# Patient Record
Sex: FEMALE | Race: ASIAN | NOT HISPANIC OR LATINO | URBAN - METROPOLITAN AREA
[De-identification: names, ages, dates, MRNs, and addresses within clinical notes are randomized per-mention and may not be internally consistent; named-entity substitution may affect disease eponyms.]

---

## 2020-07-21 ENCOUNTER — INPATIENT (INPATIENT)
Facility: HOSPITAL | Age: 55
LOS: 3 days | Discharge: HOME | DRG: 436 | End: 2020-07-25
Attending: FAMILY MEDICINE | Admitting: STUDENT IN AN ORGANIZED HEALTH CARE EDUCATION/TRAINING PROGRAM
Payer: COMMERCIAL

## 2020-07-21 ENCOUNTER — EMERGENCY (EMERGENCY)
Facility: HOSPITAL | Age: 55
LOS: 1 days | Discharge: ANOTHER HOSPITAL | End: 2020-07-21
Attending: EMERGENCY MEDICINE | Admitting: EMERGENCY MEDICINE
Payer: COMMERCIAL

## 2020-07-21 VITALS
SYSTOLIC BLOOD PRESSURE: 98 MMHG | TEMPERATURE: 99 F | DIASTOLIC BLOOD PRESSURE: 71 MMHG | OXYGEN SATURATION: 98 % | HEART RATE: 122 BPM | RESPIRATION RATE: 18 BRPM

## 2020-07-21 VITALS
RESPIRATION RATE: 21 BRPM | OXYGEN SATURATION: 100 % | HEIGHT: 63 IN | DIASTOLIC BLOOD PRESSURE: 76 MMHG | HEART RATE: 128 BPM | SYSTOLIC BLOOD PRESSURE: 101 MMHG | WEIGHT: 154.98 LBS | TEMPERATURE: 98 F

## 2020-07-21 VITALS
TEMPERATURE: 98 F | SYSTOLIC BLOOD PRESSURE: 105 MMHG | WEIGHT: 154.98 LBS | DIASTOLIC BLOOD PRESSURE: 74 MMHG | HEART RATE: 120 BPM | RESPIRATION RATE: 16 BRPM | OXYGEN SATURATION: 99 % | HEIGHT: 62.99 IN

## 2020-07-21 DIAGNOSIS — Z90.11 ACQUIRED ABSENCE OF RIGHT BREAST AND NIPPLE: Chronic | ICD-10-CM

## 2020-07-21 LAB
ALBUMIN SERPL ELPH-MCNC: 2 G/DL — LOW (ref 3.3–5)
ALP SERPL-CCNC: 539 U/L — HIGH (ref 30–120)
ALT FLD-CCNC: 68 U/L DA — HIGH (ref 10–60)
ANION GAP SERPL CALC-SCNC: 10 MMOL/L — SIGNIFICANT CHANGE UP (ref 5–17)
AST SERPL-CCNC: 98 U/L — HIGH (ref 10–40)
BASOPHILS # BLD AUTO: 0 K/UL — SIGNIFICANT CHANGE UP (ref 0–0.2)
BASOPHILS NFR BLD AUTO: 0 % — SIGNIFICANT CHANGE UP (ref 0–2)
BILIRUB SERPL-MCNC: 1.1 MG/DL — SIGNIFICANT CHANGE UP (ref 0.2–1.2)
BUN SERPL-MCNC: 17 MG/DL — SIGNIFICANT CHANGE UP (ref 7–23)
CALCIUM SERPL-MCNC: 11.9 MG/DL — HIGH (ref 8.4–10.5)
CHLORIDE SERPL-SCNC: 96 MMOL/L — SIGNIFICANT CHANGE UP (ref 96–108)
CK SERPL-CCNC: 238 U/L — HIGH (ref 26–192)
CO2 SERPL-SCNC: 22 MMOL/L — SIGNIFICANT CHANGE UP (ref 22–31)
CREAT SERPL-MCNC: 0.78 MG/DL — SIGNIFICANT CHANGE UP (ref 0.5–1.3)
EOSINOPHIL # BLD AUTO: 0.19 K/UL — SIGNIFICANT CHANGE UP (ref 0–0.5)
EOSINOPHIL NFR BLD AUTO: 1 % — SIGNIFICANT CHANGE UP (ref 0–6)
GLUCOSE SERPL-MCNC: 110 MG/DL — HIGH (ref 70–99)
HCT VFR BLD CALC: 28.5 % — LOW (ref 34.5–45)
HGB BLD-MCNC: 8.9 G/DL — LOW (ref 11.5–15.5)
LACTATE SERPL-SCNC: 2.5 MMOL/L — HIGH (ref 0.7–2)
LYMPHOCYTES # BLD AUTO: 1.16 K/UL — SIGNIFICANT CHANGE UP (ref 1–3.3)
LYMPHOCYTES # BLD AUTO: 6 % — LOW (ref 13–44)
MCHC RBC-ENTMCNC: 27.6 PG — SIGNIFICANT CHANGE UP (ref 27–34)
MCHC RBC-ENTMCNC: 31.2 GM/DL — LOW (ref 32–36)
MCV RBC AUTO: 88.2 FL — SIGNIFICANT CHANGE UP (ref 80–100)
MONOCYTES # BLD AUTO: 0.58 K/UL — SIGNIFICANT CHANGE UP (ref 0–0.9)
MONOCYTES NFR BLD AUTO: 3 % — SIGNIFICANT CHANGE UP (ref 2–14)
NEUTROPHILS # BLD AUTO: 17.4 K/UL — HIGH (ref 1.8–7.4)
NEUTROPHILS NFR BLD AUTO: 79 % — HIGH (ref 43–77)
NRBC # BLD: SIGNIFICANT CHANGE UP /100 WBCS (ref 0–0)
NT-PROBNP SERPL-SCNC: 355 PG/ML — HIGH (ref 0–125)
PLATELET # BLD AUTO: 315 K/UL — SIGNIFICANT CHANGE UP (ref 150–400)
POTASSIUM SERPL-MCNC: 4.8 MMOL/L — SIGNIFICANT CHANGE UP (ref 3.5–5.3)
POTASSIUM SERPL-SCNC: 4.8 MMOL/L — SIGNIFICANT CHANGE UP (ref 3.5–5.3)
PROT SERPL-MCNC: 6.5 G/DL — SIGNIFICANT CHANGE UP (ref 6–8.3)
RBC # BLD: 3.23 M/UL — LOW (ref 3.8–5.2)
RBC # FLD: 17.4 % — HIGH (ref 10.3–14.5)
SODIUM SERPL-SCNC: 128 MMOL/L — LOW (ref 135–145)
TROPONIN I SERPL-MCNC: 0.01 NG/ML — LOW (ref 0.02–0.06)
WBC # BLD: 19.33 K/UL — HIGH (ref 3.8–10.5)
WBC # FLD AUTO: 19.33 K/UL — HIGH (ref 3.8–10.5)

## 2020-07-21 PROCEDURE — 84484 ASSAY OF TROPONIN QUANT: CPT

## 2020-07-21 PROCEDURE — 87040 BLOOD CULTURE FOR BACTERIA: CPT

## 2020-07-21 PROCEDURE — 80053 COMPREHEN METABOLIC PANEL: CPT

## 2020-07-21 PROCEDURE — 82550 ASSAY OF CK (CPK): CPT

## 2020-07-21 PROCEDURE — 36415 COLL VENOUS BLD VENIPUNCTURE: CPT

## 2020-07-21 PROCEDURE — 83605 ASSAY OF LACTIC ACID: CPT

## 2020-07-21 PROCEDURE — 71045 X-RAY EXAM CHEST 1 VIEW: CPT

## 2020-07-21 PROCEDURE — 71045 X-RAY EXAM CHEST 1 VIEW: CPT | Mod: 26

## 2020-07-21 PROCEDURE — 93010 ELECTROCARDIOGRAM REPORT: CPT

## 2020-07-21 PROCEDURE — 96374 THER/PROPH/DIAG INJ IV PUSH: CPT

## 2020-07-21 PROCEDURE — 71275 CT ANGIOGRAPHY CHEST: CPT | Mod: 26

## 2020-07-21 PROCEDURE — 83880 ASSAY OF NATRIURETIC PEPTIDE: CPT

## 2020-07-21 PROCEDURE — 99285 EMERGENCY DEPT VISIT HI MDM: CPT

## 2020-07-21 PROCEDURE — 99285 EMERGENCY DEPT VISIT HI MDM: CPT | Mod: 25

## 2020-07-21 PROCEDURE — 82272 OCCULT BLD FECES 1-3 TESTS: CPT

## 2020-07-21 PROCEDURE — 99283 EMERGENCY DEPT VISIT LOW MDM: CPT

## 2020-07-21 PROCEDURE — 85027 COMPLETE CBC AUTOMATED: CPT

## 2020-07-21 PROCEDURE — 71275 CT ANGIOGRAPHY CHEST: CPT

## 2020-07-21 PROCEDURE — 93005 ELECTROCARDIOGRAM TRACING: CPT

## 2020-07-21 RX ORDER — SODIUM CHLORIDE 9 MG/ML
1000 INJECTION INTRAMUSCULAR; INTRAVENOUS; SUBCUTANEOUS ONCE
Refills: 0 | Status: COMPLETED | OUTPATIENT
Start: 2020-07-21 | End: 2020-07-21

## 2020-07-21 RX ORDER — ASPIRIN/CALCIUM CARB/MAGNESIUM 324 MG
325 TABLET ORAL ONCE
Refills: 0 | Status: COMPLETED | OUTPATIENT
Start: 2020-07-21 | End: 2020-07-21

## 2020-07-21 RX ORDER — FUROSEMIDE 40 MG
40 TABLET ORAL ONCE
Refills: 0 | Status: COMPLETED | OUTPATIENT
Start: 2020-07-21 | End: 2020-07-21

## 2020-07-21 RX ADMIN — Medication 325 MILLIGRAM(S): at 20:34

## 2020-07-21 RX ADMIN — Medication 40 MILLIGRAM(S): at 20:34

## 2020-07-21 RX ADMIN — SODIUM CHLORIDE 1000 MILLILITER(S): 9 INJECTION INTRAMUSCULAR; INTRAVENOUS; SUBCUTANEOUS at 23:23

## 2020-07-21 NOTE — ED ADULT NURSE NOTE - CHPI ED NUR SYMPTOMS NEG
no fever/no back pain/no nausea/no chest pain/no diaphoresis/no vomiting/no chills/no syncope/no dizziness

## 2020-07-21 NOTE — ED PROVIDER NOTE - OBJECTIVE STATEMENT
53yo female who presents with sob and leg swelling for a week. pt c/o increased leg swelling with worsening sob, no chest pain, no cough, fever, chills, pt does not have hx of chf

## 2020-07-21 NOTE — ED ADULT TRIAGE NOTE - CHIEF COMPLAINT QUOTE
I was having swelling to my legs for 1 week and feeling SOB today so I was my PMD today and he sent me here

## 2020-07-21 NOTE — ED ADULT NURSE NOTE - OBJECTIVE STATEMENT
pt c/o BL LE swelling and SOB on and off over the past few days worse today. + orthopnea, no hx of CHF in the past. pt denies vomiting, PO intolerance, neck pain, recent travel, fevers, sick contacts, SHx, headache, blurred vision, sinus congestion, hematuria, chest pain, blurred vision or any other complaints.

## 2020-07-21 NOTE — ED CLERICAL - CLERICAL COMMENTS
called [facility] ems for transport to [facility]. ems did not give me a time.

## 2020-07-21 NOTE — ED ADULT NURSE NOTE - NSSEPSISCRITERIAMET_ED_A_ED
Abnormal VS & WBC Abnormal VS & WBC/Abnormal Bands Abnormal Lactate/Abnormal Bands/Abnormal VS & WBC

## 2020-07-21 NOTE — ED PROVIDER NOTE - EKG #1 DATE/TIME
Plan for IVC filter in OR today  NPO, heparin gtt on hold for procedure  will need full dose Anticoagulation x 6months 21-Jul-2020 23:25

## 2020-07-21 NOTE — ED ADULT NURSE NOTE - OBJECTIVE STATEMENT
Received patient in bed 1. AOX4. 55yo female who presents with sob and leg swelling for a week. pt c/o increased leg swelling with worsening sob, no chest pain, no cough, fever, chills, pt does not have hx of chf. Transfer from [place] via [facility]. Received with left wrist @22g. Witnessed ambulating with steady gait. No BP right, bracelet intact. Dr. Martinez at bedside. EKG/monitor completed. Awaiting admission orders

## 2020-07-21 NOTE — ED PROVIDER NOTE - NSRISKOFTRANSFER_ED_A_ED
Transportation Risk (There is always a risk of traffic delays resulting in deterioration of condition.)/Increased Pain/Death or Disability/Deterioration of Condition En Route

## 2020-07-21 NOTE — ED PROVIDER NOTE - CLINICAL SUMMARY MEDICAL DECISION MAKING FREE TEXT BOX
53yo female with sob and leg swelling, ekg, labs, xr, r/o chf, transfer to [place] for admission

## 2020-07-22 DIAGNOSIS — Z29.9 ENCOUNTER FOR PROPHYLACTIC MEASURES, UNSPECIFIED: ICD-10-CM

## 2020-07-22 DIAGNOSIS — C50.919 MALIGNANT NEOPLASM OF UNSPECIFIED SITE OF UNSPECIFIED FEMALE BREAST: ICD-10-CM

## 2020-07-22 DIAGNOSIS — R06.00 DYSPNEA, UNSPECIFIED: ICD-10-CM

## 2020-07-22 DIAGNOSIS — J90 PLEURAL EFFUSION, NOT ELSEWHERE CLASSIFIED: ICD-10-CM

## 2020-07-22 DIAGNOSIS — I10 ESSENTIAL (PRIMARY) HYPERTENSION: ICD-10-CM

## 2020-07-22 DIAGNOSIS — R00.0 TACHYCARDIA, UNSPECIFIED: ICD-10-CM

## 2020-07-22 DIAGNOSIS — Z90.11 ACQUIRED ABSENCE OF RIGHT BREAST AND NIPPLE: Chronic | ICD-10-CM

## 2020-07-22 DIAGNOSIS — R60.0 LOCALIZED EDEMA: ICD-10-CM

## 2020-07-22 LAB
AFP-TM SERPL-MCNC: 3 NG/ML — SIGNIFICANT CHANGE UP
ALBUMIN SERPL ELPH-MCNC: 1.6 G/DL — LOW (ref 3.3–5)
ALBUMIN SERPL ELPH-MCNC: 1.7 G/DL — LOW (ref 3.3–5)
ALP SERPL-CCNC: 441 U/L — HIGH (ref 40–120)
ALP SERPL-CCNC: 535 U/L — HIGH (ref 40–120)
ALT FLD-CCNC: 55 U/L — SIGNIFICANT CHANGE UP (ref 12–78)
ALT FLD-CCNC: 60 U/L — SIGNIFICANT CHANGE UP (ref 12–78)
ANION GAP SERPL CALC-SCNC: 8 MMOL/L — SIGNIFICANT CHANGE UP (ref 5–17)
ANION GAP SERPL CALC-SCNC: 8 MMOL/L — SIGNIFICANT CHANGE UP (ref 5–17)
ANISOCYTOSIS BLD QL: SLIGHT — SIGNIFICANT CHANGE UP
AST SERPL-CCNC: 73 U/L — HIGH (ref 15–37)
AST SERPL-CCNC: 85 U/L — HIGH (ref 15–37)
BASOPHILS # BLD AUTO: 0 K/UL — SIGNIFICANT CHANGE UP (ref 0–0.2)
BASOPHILS # BLD AUTO: 0.03 K/UL — SIGNIFICANT CHANGE UP (ref 0–0.2)
BASOPHILS NFR BLD AUTO: 0 % — SIGNIFICANT CHANGE UP (ref 0–2)
BASOPHILS NFR BLD AUTO: 0.2 % — SIGNIFICANT CHANGE UP (ref 0–2)
BCA 255 TISS QL IMSTN: 55.6 U/ML — HIGH
BILIRUB SERPL-MCNC: 1 MG/DL — SIGNIFICANT CHANGE UP (ref 0.2–1.2)
BILIRUB SERPL-MCNC: 1.1 MG/DL — SIGNIFICANT CHANGE UP (ref 0.2–1.2)
BUN SERPL-MCNC: 15 MG/DL — SIGNIFICANT CHANGE UP (ref 7–23)
BUN SERPL-MCNC: 15 MG/DL — SIGNIFICANT CHANGE UP (ref 7–23)
CALCIUM SERPL-MCNC: 10 MG/DL — SIGNIFICANT CHANGE UP (ref 8.5–10.1)
CALCIUM SERPL-MCNC: 10.5 MG/DL — HIGH (ref 8.5–10.1)
CEA SERPL-MCNC: 6607 NG/ML — HIGH (ref 0–3.8)
CHLORIDE SERPL-SCNC: 103 MMOL/L — SIGNIFICANT CHANGE UP (ref 96–108)
CHLORIDE SERPL-SCNC: 99 MMOL/L — SIGNIFICANT CHANGE UP (ref 96–108)
CO2 SERPL-SCNC: 23 MMOL/L — SIGNIFICANT CHANGE UP (ref 22–31)
CO2 SERPL-SCNC: 26 MMOL/L — SIGNIFICANT CHANGE UP (ref 22–31)
CREAT SERPL-MCNC: 0.54 MG/DL — SIGNIFICANT CHANGE UP (ref 0.5–1.3)
CREAT SERPL-MCNC: 0.62 MG/DL — SIGNIFICANT CHANGE UP (ref 0.5–1.3)
CRP SERPL-MCNC: 17.43 MG/DL — HIGH (ref 0–0.4)
EOSINOPHIL # BLD AUTO: 0 K/UL — SIGNIFICANT CHANGE UP (ref 0–0.5)
EOSINOPHIL # BLD AUTO: 0.01 K/UL — SIGNIFICANT CHANGE UP (ref 0–0.5)
EOSINOPHIL NFR BLD AUTO: 0 % — SIGNIFICANT CHANGE UP (ref 0–6)
EOSINOPHIL NFR BLD AUTO: 0.1 % — SIGNIFICANT CHANGE UP (ref 0–6)
ERYTHROCYTE [SEDIMENTATION RATE] IN BLOOD: 105 MM/HR — HIGH (ref 0–20)
FERRITIN SERPL-MCNC: 7272 NG/ML — HIGH (ref 15–150)
FOLATE SERPL-MCNC: 3.3 NG/ML — LOW
GLUCOSE SERPL-MCNC: 131 MG/DL — HIGH (ref 70–99)
GLUCOSE SERPL-MCNC: 99 MG/DL — SIGNIFICANT CHANGE UP (ref 70–99)
HCT VFR BLD CALC: 23.3 % — LOW (ref 34.5–45)
HCT VFR BLD CALC: 23.5 % — LOW (ref 34.5–45)
HGB BLD-MCNC: 7.5 G/DL — LOW (ref 11.5–15.5)
HGB BLD-MCNC: 7.5 G/DL — LOW (ref 11.5–15.5)
IMM GRANULOCYTES NFR BLD AUTO: 3 % — HIGH (ref 0–1.5)
IRON SATN MFR SERPL: 24 % — SIGNIFICANT CHANGE UP (ref 14–50)
IRON SATN MFR SERPL: 38 UG/DL — SIGNIFICANT CHANGE UP (ref 30–160)
LACTATE SERPL-SCNC: 2.1 MMOL/L — HIGH (ref 0.7–2)
LYMPHOCYTES # BLD AUTO: 0.87 K/UL — LOW (ref 1–3.3)
LYMPHOCYTES # BLD AUTO: 1.05 K/UL — SIGNIFICANT CHANGE UP (ref 1–3.3)
LYMPHOCYTES # BLD AUTO: 6 % — LOW (ref 13–44)
LYMPHOCYTES # BLD AUTO: 7.8 % — LOW (ref 13–44)
MACROCYTES BLD QL: SLIGHT — SIGNIFICANT CHANGE UP
MAGNESIUM SERPL-MCNC: 1.9 MG/DL — SIGNIFICANT CHANGE UP (ref 1.6–2.6)
MANUAL SMEAR VERIFICATION: YES — SIGNIFICANT CHANGE UP
MCHC RBC-ENTMCNC: 27.5 PG — SIGNIFICANT CHANGE UP (ref 27–34)
MCHC RBC-ENTMCNC: 27.7 PG — SIGNIFICANT CHANGE UP (ref 27–34)
MCHC RBC-ENTMCNC: 31.9 GM/DL — LOW (ref 32–36)
MCHC RBC-ENTMCNC: 32.2 GM/DL — SIGNIFICANT CHANGE UP (ref 32–36)
MCV RBC AUTO: 86 FL — SIGNIFICANT CHANGE UP (ref 80–100)
MCV RBC AUTO: 86.1 FL — SIGNIFICANT CHANGE UP (ref 80–100)
MICROCYTES BLD QL: SLIGHT — SIGNIFICANT CHANGE UP
MONOCYTES # BLD AUTO: 1.2 K/UL — HIGH (ref 0–0.9)
MONOCYTES # BLD AUTO: 1.6 K/UL — HIGH (ref 0–0.9)
MONOCYTES NFR BLD AUTO: 11 % — SIGNIFICANT CHANGE UP (ref 2–14)
MONOCYTES NFR BLD AUTO: 8.9 % — SIGNIFICANT CHANGE UP (ref 2–14)
NEUTROPHILS # BLD AUTO: 10.83 K/UL — HIGH (ref 1.8–7.4)
NEUTROPHILS # BLD AUTO: 12.04 K/UL — HIGH (ref 1.8–7.4)
NEUTROPHILS NFR BLD AUTO: 80 % — HIGH (ref 43–77)
NEUTROPHILS NFR BLD AUTO: 83 % — HIGH (ref 43–77)
NRBC # BLD: 0 /100 WBCS — SIGNIFICANT CHANGE UP (ref 0–0)
NRBC # BLD: 0 — SIGNIFICANT CHANGE UP
NRBC # BLD: SIGNIFICANT CHANGE UP /100 WBCS (ref 0–0)
PHOSPHATE SERPL-MCNC: 2.4 MG/DL — LOW (ref 2.5–4.5)
PLAT MORPH BLD: NORMAL — SIGNIFICANT CHANGE UP
PLATELET # BLD AUTO: 258 K/UL — SIGNIFICANT CHANGE UP (ref 150–400)
PLATELET # BLD AUTO: 265 K/UL — SIGNIFICANT CHANGE UP (ref 150–400)
POTASSIUM SERPL-MCNC: 3.6 MMOL/L — SIGNIFICANT CHANGE UP (ref 3.5–5.3)
POTASSIUM SERPL-MCNC: 3.8 MMOL/L — SIGNIFICANT CHANGE UP (ref 3.5–5.3)
POTASSIUM SERPL-SCNC: 3.6 MMOL/L — SIGNIFICANT CHANGE UP (ref 3.5–5.3)
POTASSIUM SERPL-SCNC: 3.8 MMOL/L — SIGNIFICANT CHANGE UP (ref 3.5–5.3)
PROT SERPL-MCNC: 5.3 G/DL — LOW (ref 6–8.3)
PROT SERPL-MCNC: 5.5 G/DL — LOW (ref 6–8.3)
RBC # BLD: 2.7 M/UL — LOW (ref 3.8–5.2)
RBC # BLD: 2.71 M/UL — LOW (ref 3.8–5.2)
RBC # BLD: 2.73 M/UL — LOW (ref 3.8–5.2)
RBC # FLD: 17.3 % — HIGH (ref 10.3–14.5)
RBC # FLD: 17.7 % — HIGH (ref 10.3–14.5)
RBC BLD AUTO: SIGNIFICANT CHANGE UP
RETICS #: 119.9 K/UL — SIGNIFICANT CHANGE UP (ref 25–125)
RETICS/RBC NFR: 4.4 % — HIGH (ref 0.5–2.5)
SARS-COV-2 IGG SERPL QL IA: NEGATIVE — SIGNIFICANT CHANGE UP
SARS-COV-2 IGM SERPL IA-ACNC: <0.1 INDEX — SIGNIFICANT CHANGE UP
SARS-COV-2 RNA SPEC QL NAA+PROBE: SIGNIFICANT CHANGE UP
SODIUM SERPL-SCNC: 133 MMOL/L — LOW (ref 135–145)
SODIUM SERPL-SCNC: 134 MMOL/L — LOW (ref 135–145)
TIBC SERPL-MCNC: 159 UG/DL — LOW (ref 220–430)
UIBC SERPL-MCNC: 121 UG/DL — SIGNIFICANT CHANGE UP (ref 110–370)
WBC # BLD: 13.52 K/UL — HIGH (ref 3.8–10.5)
WBC # BLD: 14.51 K/UL — HIGH (ref 3.8–10.5)
WBC # FLD AUTO: 13.52 K/UL — HIGH (ref 3.8–10.5)
WBC # FLD AUTO: 14.51 K/UL — HIGH (ref 3.8–10.5)

## 2020-07-22 PROCEDURE — 74183 MRI ABD W/O CNTR FLWD CNTR: CPT | Mod: 26

## 2020-07-22 PROCEDURE — 93010 ELECTROCARDIOGRAM REPORT: CPT

## 2020-07-22 PROCEDURE — 93970 EXTREMITY STUDY: CPT | Mod: 26

## 2020-07-22 PROCEDURE — 99223 1ST HOSP IP/OBS HIGH 75: CPT | Mod: GC,AI

## 2020-07-22 RX ORDER — ENOXAPARIN SODIUM 100 MG/ML
40 INJECTION SUBCUTANEOUS AT BEDTIME
Refills: 0 | Status: DISCONTINUED | OUTPATIENT
Start: 2020-07-22 | End: 2020-07-23

## 2020-07-22 RX ORDER — IBUPROFEN 200 MG
200 TABLET ORAL ONCE
Refills: 0 | Status: COMPLETED | OUTPATIENT
Start: 2020-07-22 | End: 2020-07-22

## 2020-07-22 RX ORDER — METOPROLOL TARTRATE 50 MG
5 TABLET ORAL ONCE
Refills: 0 | Status: COMPLETED | OUTPATIENT
Start: 2020-07-22 | End: 2020-07-22

## 2020-07-22 RX ADMIN — Medication 200 MILLIGRAM(S): at 09:07

## 2020-07-22 RX ADMIN — ENOXAPARIN SODIUM 40 MILLIGRAM(S): 100 INJECTION SUBCUTANEOUS at 21:34

## 2020-07-22 RX ADMIN — Medication 20 MILLIGRAM(S): at 05:46

## 2020-07-22 RX ADMIN — Medication 200 MILLIGRAM(S): at 10:22

## 2020-07-22 RX ADMIN — Medication 5 MILLIGRAM(S): at 09:29

## 2020-07-22 NOTE — ED ADULT NURSE REASSESSMENT NOTE - NS ED NURSE REASSESS COMMENT FT1
Patient provided food at this time and abner. Speaking to daughter on the phone at this time. No s/s of distress

## 2020-07-22 NOTE — PROGRESS NOTE ADULT - PROBLEM SELECTOR PLAN 4
Patient w/ hx of tachycardia after R mastectomy  -continue to monitor on remote telemetry  -Lopressor ordered.  -monitor vitals.

## 2020-07-22 NOTE — H&P ADULT - NSHPREVIEWOFSYSTEMS_GEN_ALL_CORE
REVIEW OF SYSTEMS:  CONSTITUTIONAL: No weakness, fevers or chills  EYES/ENT: No visual changes;  No vertigo or throat pain   RESPIRATORY: No cough, wheezing, hemoptysis; Positive for shortness of breath, orthopnea, BALLARD  CARDIOVASCULAR: No chest pain or palpitations. Positive for tachycardia  GASTROINTESTINAL: No abdominal or epigastric pain. No nausea, vomiting, or hematemesis; No diarrhea or constipation. No melena or hematochezia.  GENITOURINARY: No dysuria, frequency or hematuria  NEUROLOGICAL: No numbness or weakness CONSTITUTIONAL: denies fever, chills, fatigue, weakness  HEENT: denies blurred vision, sore throat  SKIN: denies new lesions, rash  CARDIOVASCULAR: denies chest pain, chest pressure, palpitations  RESPIRATORY: No cough, wheezing, hemoptysis; Positive for shortness of breath, orthopnea, BALLARD  GASTROINTESTINAL: denies nausea, vomiting, diarrhea, abdominal pain  GENITOURINARY: denies dysuria, discharge  NEUROLOGICAL: denies numbness, headache, focal weakness  MUSCULOSKELETAL: denies new joint pain, muscle aches  HEMATOLOGIC: denies gross bleeding, bruising  LYMPHATICS: denies enlarged lymph nodes, extremity swelling  PSYCHIATRIC: denies recent changes in anxiety, depression  ENDOCRINOLOGIC: denies sweating, cold or heat intolerance

## 2020-07-22 NOTE — PROGRESS NOTE ADULT - PROBLEM SELECTOR PLAN 1
Hx of breast CA s/p mastectomy, radiation, and chemo   -CTA showed possible liver and rib mets.   -Pt reports biopsy in March 2020 at [place] confirmed scar tissue in the ribs.  -evaluate for CHF. TTE ordered   -Jamir w/ Lasix

## 2020-07-22 NOTE — H&P ADULT - ASSESSMENT
53yo F with PMH of breast CA s/p R mastectomy, radiation, chemo (final treatment May 2019), HTN, and tachycardia presents to the ED for worsening SOB, BALLARD, and BLE edema x1 week. Patient is being admitted for dyspnea 55yo F with PMH of breast CA s/p R mastectomy, radiation, chemo (final treatment May 2019), HTN, and tachycardia presents to the ED for worsening SOB, BALLARD, and BLE edema x1 week. Patient is being admitted for dyspnea - evaluate for CHF. Concern for metastatic disease.

## 2020-07-22 NOTE — H&P ADULT - NSHPSOCIALHISTORY_GEN_ALL_CORE
Denies tobacco, alcohol, and recreational drug use Lives at home  Denies tobacco, alcohol, and recreational drug use  Colonoscopy >10 years ago - negative  PAP smear 1 year ago - negative

## 2020-07-22 NOTE — H&P ADULT - HISTORY OF PRESENT ILLNESS
53yo F with PMH of breast CA s/p R mastectomy, radiation, chemo (final treatment May 2019), HTN, and tachycardia presents to the ED for worsening SOB, BALLARD, and BLE edema x1 week. Patient states that for the past 2mo, she has been experiencing mild dyspnea on exertion. Over the past week, she notes significant worsening of her sx. Patient has no prior hx of CHF. Patient was transferred from [place] ED. RR was 21 and HR was 128. Patient states that she has been tachycardic at baseline since her mastectomy last year. She was found to have a WBC count of 19.3, proBNP of 355, alk phos 539, and AST/ALT of 93 and 68. CTA was performed and showed R pleural effusion, 5mm ground glass nodules in RUL, and suspicious liver and rib mets. Patient was given Lasix 40mg and 1L NS in the ED.     In the ED:  Vitals:  and RR 20. All other vitals stable  CBC: WBC count 14.5, H&H 7.5/23.3  Chem: Albumin 1.6, Alk phos 441, AST 73, ALT 55

## 2020-07-22 NOTE — CONSULT NOTE ADULT - SUBJECTIVE AND OBJECTIVE BOX
Patient is a 54y old  Female who presents with a chief complaint of SOB and BLE edema (2020 03:01)      HPI:  55yo F with PMH of breast CA s/p R mastectomy, radiation, chemo (final treatment May 2019), HTN, and tachycardia presents to the ED for worsening SOB, BALLARD, and BLE edema x1 week. Patient states that for the past 2mo, she has been experiencing mild dyspnea on exertion. Over the past week, she notes significant worsening of her sx. Patient has no prior hx of CHF. Patient was transferred from [place] ED. RR was 21 and HR was 128. Patient states that she has been tachycardic at baseline since her mastectomy last year. She was found to have a WBC count of 19.3, proBNP of 355, alk phos 539, and AST/ALT of 93 and 68. CTA was performed and showed R pleural effusion, 5mm ground glass nodules in RUL, and suspicious liver and rib mets. Patient was given Lasix 40mg and 1L NS in the ED.     In the ED:  Vitals:  and RR 20. All other vitals stable  CBC: WBC count 14.5, H&H 7.5/23.3  Chem: Albumin 1.6, Alk phos 441, AST 73, ALT 55 (2020 03:01)        PAST MEDICAL & SURGICAL HISTORY:  Breast cancer: s/p R mastectomy, radiation, and chemotherapy  Hypertension  HTN (hypertension)  H/O mastectomy, right: 2019  H/O mastectomy, right       HEALTH ISSUES - PROBLEM Dx:  Hypertension: Hypertension  Need for prophylactic measure: Need for prophylactic measure  Tachycardia: Tachycardia  Lower extremity edema: Lower extremity edema  Breast cancer: Breast cancer  Dyspnea, unspecified type: Dyspnea, unspecified type          Pleural effusion, not elsewhere classified (J90)  Breast cancer (C50.919)  Pleural effusion (J90)  Hypertension (I10)  HTN (hypertension) (I10)  H/O mastectomy, right (Z90.11)  H/O mastectomy, right (Z90.11)  Tachycardia (R00.0)      FAMILY HISTORY:  FH: diabetes mellitus: Mother  father   mother       [SOCIAL HISTORY: ]     smoking:  denies     EtOH:  denies     illicit drugs:  denies     occupation:       marital status:  , 1 dtr     Other:       [ALLERGIES/INTOLERANCES:]  Allergies     amoxicillin (Hives)  Intolerances      [MEDICATIONS]  MEDICATIONS  (STANDING):  enalapril 20 milliGRAM(s) Oral daily  enoxaparin Injectable 40 milliGRAM(s) SubCutaneous at bedtime    MEDICATIONS  (PRN):        [REVIEW OF SYSTEMS: ]  CONSTITUTIONAL: normal, no fever, no shakes, no chills   EYES: No eye pain, no visual disturbances, no discharge  ENMT:  no discharge  NECK: No pain, no stiffness  BREASTS: No pain, no masses, no nipple discharge  RESPIRATORY: No cough, no wheezing, no chills, no hemoptysis; +shortness of breath  CARDIOVASCULAR: No chest pain, no palpitations, no dizziness, no leg swelling, +tachycardia  GASTROINTESTINAL: No abdominal, no epigastric pain. No nausea, no vomiting, no hematemesis; No diarrhea , no constipation. No melena, no hematochezia.  GENITOURINARY: No dysuria, no frequency, no hematuria, no incontinence  NEUROLOGICAL: No headaches, no memory loss, no loss of strength, no numbness, no tremors  SKIN: No itching, no burning, no rashes, no lesions   LYMPH NODES: No enlarged glands  ENDOCRINE: No heat or cold intolerance; No hair loss  MUSCULOSKELETAL: No joint pain or swelling; No muscle, no back, no extremity pain  PSYCHIATRIC: No depression, no anxiety, no mood swings, no difficulty sleeping  HEME/LYMPH: No easy bruising, no bleeding gums      [VITALS SIGNS 24hrs]  Vital Signs Last 24 Hrs  T(C): 36.9 (2020 08:49), Max: 37.5 (2020 05:40)  T(F): 98.4 (2020 08:49), Max: 99.5 (2020 05:40)  HR: 116 (2020 10:17) (108 - 137)  BP: 107/72 (2020 10:17) (92/63 - 132/85)  BP(mean): --  RR: 17 (2020 08:49) (16 - 22)  SpO2: 99% (2020 08:49) (95% - 100%)    [PHYSICAL EXAM]  General: adult in NAD,  WN,  WD.  HEENT: clear oropharynx, anicteric sclera, pink conjunctivae.  Neck: supple, no masses.  CV: normal S1S2, no murmur, no rubs, no gallops.  Lungs: clear to auscultation, no wheezes, no rales, no rhonchi.  Abdomen: soft, non-tender, non-distended, no hepatosplenomegaly, normal BS, no guarding.  Ext: no clubbing, no cyanosis, no edema.   Skin: no rashes,  no petechiae, no venous stasis changes.  Neuro: alert and oriented X3, no focal motor deficits.  LN: no SC ARNOL.  R mastectomy      [LABS: ]                        7.5    13.52 )-----------( 258      ( 2020 07:12 )             23.5     CBC Full  -  ( 2020 07:12 )  WBC Count : 13.52 K/uL  RBC Count : 2.73 M/uL  Hemoglobin : 7.5 g/dL  Hematocrit : 23.5 %  Platelet Count - Automated : 258 K/uL  Mean Cell Volume : 86.1 fl  Mean Cell Hemoglobin : 27.5 pg  Mean Cell Hemoglobin Concentration : 31.9 gm/dL  Auto Neutrophil # : 10.83 K/uL  Auto Lymphocyte # : 1.05 K/uL  Auto Monocyte # : 1.20 K/uL  Auto Eosinophil # : 0.01 K/uL  Auto Basophil # : 0.03 K/uL  Auto Neutrophil % : 80.0 %  Auto Lymphocyte % : 7.8 %  Auto Monocyte % : 8.9 %  Auto Eosinophil % : 0.1 %  Auto Basophil % : 0.2 %        133<L>  |  99  |  15  ----------------------------<  131<H>  3.6   |  26  |  0.62    Ca    10.5<H>      2020 07:12  Phos  2.4       Mg     1.9         TPro  5.5<L>  /  Alb  1.7<L>  /  TBili  1.1  /  DBili  x   /  AST  85<H>  /  ALT  60  /  AlkPhos  535<H>        LIVER FUNCTIONS - ( 2020 07:12 )  Alb: 1.7 g/dL / Pro: 5.5 g/dL / ALK PHOS: 535 U/L / ALT: 60 U/L / AST: 85 U/L / GGT: x           CARDIAC MARKERS ( 2020 20:34 )  .011 ng/mL / x     / 238 U/L / x     / x              WBC  TREND (5 Days)  WBC Count: 13.52 K/uL ( @ 07:12)  WBC Count: 14.51 K/uL ( @ 00:16)  WBC Count: 19.33 K/uL ( @ 20:34)    HGB  TREND (5 Days)  Hemoglobin: 7.5 g/dL ( @ 07:12)  Hemoglobin: 7.5 g/dL ( 00:16)  Hemoglobin: 8.9 g/dL ( 20:34)    HCT  TREND (5 Days)  Hematocrit: 23.5 % ( @ 07:12)  Hematocrit: 23.3 % (:16)  Hematocrit: 28.5 % (:34)    PLT  TREND (5 Days)  Platelet Count - Automated: 258 K/uL ( @ 07:12)  Platelet Count - Automated: 265 K/uL ( @ 00:16)  Platelet Count - Automated: 315 K/uL ( 20:34)    COVID-19 PCR: NotDetec (2020 00:22)              [RADIOLOGY & ADDITIONAL STUDIES:]     < from: CT Angio Chest w/ IV Cont (20 21:22) >  EXAM:  CT ANGIO CHEST (W)AW IC                        PROCEDURE DATE:  2020    INTERPRETATION:  EXAMINATION:  CTA CHEST WITH IV CONTRAST, CT PULMONARY ANGIOGRAM  DATE OF EXAM: 2020 9:22 PM  HISTORY: Shortness of breath, chest pain  TECHNIQUE: CTA examination of the chest was performed following the intravenous administration of 71 mL Omnipaque 350.  29 mL Omnipaque 350 discarded. Sagittal, coronal and 3-D reformatted images were provided. CT dose lowering techniques were used, to include: automated exposure control, adjustment for patient size, and or use of iterative reconstruction.  COMPARISON: None.  FINDINGS:  ·	Lungs: 5 mm groundglass nodule in the right upper lobe on axial series 5, image 54. Central airways are clear.  ·	Pleura: Small right pleural effusion. No pneumothorax.  ·	Mediastinum and Karen: No adenopathy or hemorrhage.  ·	Pulmonary Arteries: Well opacified to the distal segmental level. No acute thromboembolus. Main pulmonary artery is normal in caliber.  ·	Cardiovascular: Heart size is normal. Thoracic aorta normal in caliber without dissection.   ·	Upper Abdomen: A numerable hypodense nodules in the severely enlarged liver..  ·	Chest Wall: Right-sided mastectomy.  ·	Musculoskeletal: Lytic lesion in the posterior lateral left 10th rib. This is best seen on coronal series 7, image 52 and sagittal series 8, image 24. This lesion extends to both the anterior and posterior cortex..  IMPRESSION:  1.  No acute pulmonary embolus.  2.  Appearance of the liver is highly suspicious for metastatic disease.  3.  Small right pleural effusion.  4.  Lytic lesion in the left 10th rib, also suspicious for metastasis.  5.  5 mm groundglass micronodules in the right upper lobe, indeterminate.  < end of copied text >      < from: US Duplex Venous Lower Ext Complete, Bilateral (20 @ 12:05) >  EXAM:  US DPLX LWR EXT VEINS COMPL BI                        PROCEDURE DATE:  2020    INTERPRETATION:  CLINICAL INFORMATION: Bilateral leg swelling  COMPARISON: None available.  TECHNIQUE: Duplex sonography of the BILATERAL LOWER extremity veins with color and spectral Doppler, with and without compression.    FINDINGS:  ·	There is normal compressibility of the bilateral common femoral, femoral and popliteal veins.   ·	Doppler examination shows normal spontaneous and phasic flow.  ·	No calf vein thrombosis is detected.  ·	Chronic appearing organized hematoma in the distal right calf measuring approximately 3.9 x 1.9 cm  IMPRESSION:   ·	No evidence of deep venous thrombosis in either lower extremity.  ·	Chronic appearing organized hematoma in the distal right calf. Follow-up as clinically indicated  < end of copied text >

## 2020-07-22 NOTE — PROGRESS NOTE ADULT - SUBJECTIVE AND OBJECTIVE BOX
Patient is a 54y old  Female who presents with a chief complaint of SOB and BLE edema (22 Jul 2020 12:17)      INTERVAL HPI/OVERNIGHT EVENTS: Pt was seen and examined at the bedside this morning. She reports that she is uncomfortable because she still has shortness of breath.    MEDICATIONS  (STANDING):  enalapril 20 milliGRAM(s) Oral daily  enoxaparin Injectable 40 milliGRAM(s) SubCutaneous at bedtime    MEDICATIONS  (PRN):      Allergies    amoxicillin (Hives)    Intolerances        REVIEW OF SYSTEMS:  CONSTITUTIONAL: No fever or chills  HEENT: No headache, no sore throat  RESPIRATORY: No cough, wheezing, or shortness of breath  CARDIOVASCULAR: No chest pain, palpitations  GASTROINTESTINAL: No abd pain, nausea, vomiting, or diarrhea  GENITOURINARY: No dysuria, frequency, or hematuria  NEUROLOGICAL: No focal weakness or dizziness  MUSCULOSKELETAL: No myalgias     Vital Signs Last 24 Hrs  T(C): 36.9 (22 Jul 2020 08:49), Max: 37.5 (22 Jul 2020 05:40)  T(F): 98.4 (22 Jul 2020 08:49), Max: 99.5 (22 Jul 2020 05:40)  HR: 116 (22 Jul 2020 10:17) (108 - 137)  BP: 107/72 (22 Jul 2020 10:17) (92/63 - 132/85)  BP(mean): --  RR: 17 (22 Jul 2020 08:49) (16 - 22)  SpO2: 99% (22 Jul 2020 08:49) (95% - 100%)    PHYSICAL EXAM:  GENERAL: NAD  HEENT:  anicteric, moist mucous membranes  CHEST/LUNG:  CTA b/l, no rales, wheezes, or rhonchi  HEART:  RRR, S1, S2  ABDOMEN:  BS+, soft, nontender, nondistended  EXTREMITIES: no edema, cyanosis, or calf tenderness  NERVOUS SYSTEM: answers questions and follows commands appropriately    LABS:                        7.5    13.52 )-----------( 258      ( 22 Jul 2020 07:12 )             23.5     CBC Full  -  ( 22 Jul 2020 07:12 )  WBC Count : 13.52 K/uL  Hemoglobin : 7.5 g/dL  Hematocrit : 23.5 %  Platelet Count - Automated : 258 K/uL  Mean Cell Volume : 86.1 fl  Mean Cell Hemoglobin : 27.5 pg  Mean Cell Hemoglobin Concentration : 31.9 gm/dL  Auto Neutrophil # : 10.83 K/uL  Auto Lymphocyte # : 1.05 K/uL  Auto Monocyte # : 1.20 K/uL  Auto Eosinophil # : 0.01 K/uL  Auto Basophil # : 0.03 K/uL  Auto Neutrophil % : 80.0 %  Auto Lymphocyte % : 7.8 %  Auto Monocyte % : 8.9 %  Auto Eosinophil % : 0.1 %  Auto Basophil % : 0.2 %    22 Jul 2020 07:12    133    |  99     |  15     ----------------------------<  131    3.6     |  26     |  0.62     Ca    10.5       22 Jul 2020 07:12  Phos  2.4       22 Jul 2020 07:12  Mg     1.9       22 Jul 2020 07:12    TPro  5.5    /  Alb  1.7    /  TBili  1.1    /  DBili  x      /  AST  85     /  ALT  60     /  AlkPhos  535    22 Jul 2020 07:12        CAPILLARY BLOOD GLUCOSE              RADIOLOGY & ADDITIONAL TESTS:    Personally reviewed.     Consultant(s) Notes Reviewed:  [x] YES  [ ] NO Patient is a 54y old  Female who presents with a chief complaint of SOB and BLE edema (22 Jul 2020 12:17)      INTERVAL HPI/OVERNIGHT EVENTS: Pt was seen and examined at the bedside this morning. She reports that she is uncomfortable because she still has shortness of breath and palpitations. She is not having chest pain/ pain on respiration but can feel her heart racing.     MEDICATIONS  (STANDING):  enalapril 20 milliGRAM(s) Oral daily  enoxaparin Injectable 40 milliGRAM(s) SubCutaneous at bedtime    MEDICATIONS  (PRN):      Allergies    amoxicillin (Hives)    Intolerances        REVIEW OF SYSTEMS:  CONSTITUTIONAL: No fever or chills  HEENT: No headache  RESPIRATORY: +Shortness of breath. No cough, wheezing.  CARDIOVASCULAR: No chest pain, palpitations  GASTROINTESTINAL: No abd pain, nausea, vomiting, or diarrhea  GENITOURINARY: No dysuria, frequency, or hematuria  NEUROLOGICAL: No focal weakness or dizziness  MUSCULOSKELETAL: No myalgias     Vital Signs Last 24 Hrs  T(C): 36.9 (22 Jul 2020 08:49), Max: 37.5 (22 Jul 2020 05:40)  T(F): 98.4 (22 Jul 2020 08:49), Max: 99.5 (22 Jul 2020 05:40)  HR: 116 (22 Jul 2020 10:17) (108 - 137)  BP: 107/72 (22 Jul 2020 10:17) (92/63 - 132/85)  BP(mean): --  RR: 17 (22 Jul 2020 08:49) (16 - 22)  SpO2: 99% (22 Jul 2020 08:49) (95% - 100%)    PHYSICAL EXAM:  GENERAL: NAD  HEENT:  anicteric, moist mucous membranes  CHEST/LUNG:  CTA b/l, no rales, wheezes, or rhonchi  HEART:  RRR, S1, S2  ABDOMEN:  BS+, soft, nontender, nondistended  EXTREMITIES: no edema, cyanosis, or calf tenderness  NERVOUS SYSTEM: answers questions and follows commands appropriately    LABS:                        7.5    13.52 )-----------( 258      ( 22 Jul 2020 07:12 )             23.5     CBC Full  -  ( 22 Jul 2020 07:12 )  WBC Count : 13.52 K/uL  Hemoglobin : 7.5 g/dL  Hematocrit : 23.5 %  Platelet Count - Automated : 258 K/uL  Mean Cell Volume : 86.1 fl  Mean Cell Hemoglobin : 27.5 pg  Mean Cell Hemoglobin Concentration : 31.9 gm/dL  Auto Neutrophil # : 10.83 K/uL  Auto Lymphocyte # : 1.05 K/uL  Auto Monocyte # : 1.20 K/uL  Auto Eosinophil # : 0.01 K/uL  Auto Basophil # : 0.03 K/uL  Auto Neutrophil % : 80.0 %  Auto Lymphocyte % : 7.8 %  Auto Monocyte % : 8.9 %  Auto Eosinophil % : 0.1 %  Auto Basophil % : 0.2 %    22 Jul 2020 07:12    133    |  99     |  15     ----------------------------<  131    3.6     |  26     |  0.62     Ca    10.5       22 Jul 2020 07:12  Phos  2.4       22 Jul 2020 07:12  Mg     1.9       22 Jul 2020 07:12    TPro  5.5    /  Alb  1.7    /  TBili  1.1    /  DBili  x      /  AST  85     /  ALT  60     /  AlkPhos  535    22 Jul 2020 07:12        CAPILLARY BLOOD GLUCOSE              RADIOLOGY & ADDITIONAL TESTS:    Personally reviewed.     Consultant(s) Notes Reviewed:  [x] YES  [ ] NO Patient is a 54y old  Female who presents with a chief complaint of SOB and BLE edema (22 Jul 2020 12:17)      INTERVAL HPI/OVERNIGHT EVENTS: Pt was seen and examined at the bedside this morning. She reports that she is uncomfortable because she still has shortness of breath and palpitations. She is not having chest pain/ pain on respiration but can feel her heart racing.     MEDICATIONS  (STANDING):  enalapril 20 milliGRAM(s) Oral daily  enoxaparin Injectable 40 milliGRAM(s) SubCutaneous at bedtime    MEDICATIONS  (PRN):      Allergies    amoxicillin (Hives)    Intolerances        REVIEW OF SYSTEMS:  CONSTITUTIONAL: No fever or chills  HEENT: No headache  RESPIRATORY: +Shortness of breath. No cough, wheezing.  CARDIOVASCULAR: No chest pain, + Palpitations  GASTROINTESTINAL: No abd pain, nausea, vomiting, or diarrhea  GENITOURINARY: No dysuria, frequency, or hematuria  NEUROLOGICAL: No focal weakness or dizziness  MUSCULOSKELETAL: No myalgias     Vital Signs Last 24 Hrs  T(C): 36.9 (22 Jul 2020 08:49), Max: 37.5 (22 Jul 2020 05:40)  T(F): 98.4 (22 Jul 2020 08:49), Max: 99.5 (22 Jul 2020 05:40)  HR: 116 (22 Jul 2020 10:17) (108 - 137)  BP: 107/72 (22 Jul 2020 10:17) (92/63 - 132/85)  BP(mean): --  RR: 17 (22 Jul 2020 08:49) (16 - 22)  SpO2: 99% (22 Jul 2020 08:49) (95% - 100%)    PHYSICAL EXAM:  GENERAL: NAD, awake and alert.  HEENT:  anicteric, moist mucous membranes  CHEST/LUNG:  CTA b/l, no rales, wheezes, or rhonchi  HEART:  RRR, S1, S2, tachycardic.  ABDOMEN:  soft, nontender, nondistended  EXTREMITIES: no edema, cyanosis, or calf tenderness, +DP b/l  NERVOUS SYSTEM: answers questions and follows commands appropriately.    LABS:                        7.5    13.52 )-----------( 258      ( 22 Jul 2020 07:12 )             23.5     CBC Full  -  ( 22 Jul 2020 07:12 )  WBC Count : 13.52 K/uL  Hemoglobin : 7.5 g/dL  Hematocrit : 23.5 %  Platelet Count - Automated : 258 K/uL  Mean Cell Volume : 86.1 fl  Mean Cell Hemoglobin : 27.5 pg  Mean Cell Hemoglobin Concentration : 31.9 gm/dL  Auto Neutrophil # : 10.83 K/uL  Auto Lymphocyte # : 1.05 K/uL  Auto Monocyte # : 1.20 K/uL  Auto Eosinophil # : 0.01 K/uL  Auto Basophil # : 0.03 K/uL  Auto Neutrophil % : 80.0 %  Auto Lymphocyte % : 7.8 %  Auto Monocyte % : 8.9 %  Auto Eosinophil % : 0.1 %  Auto Basophil % : 0.2 %    22 Jul 2020 07:12    133    |  99     |  15     ----------------------------<  131    3.6     |  26     |  0.62     Ca    10.5       22 Jul 2020 07:12  Phos  2.4       22 Jul 2020 07:12  Mg     1.9       22 Jul 2020 07:12    TPro  5.5    /  Alb  1.7    /  TBili  1.1    /  DBili  x      /  AST  85     /  ALT  60     /  AlkPhos  535    22 Jul 2020 07:12        CAPILLARY BLOOD GLUCOSE              RADIOLOGY & ADDITIONAL TESTS:    Personally reviewed.     Consultant(s) Notes Reviewed:  [x] YES  [ ] NO Patient is a 54y old  Female who presents with a chief complaint of SOB and BLE edema (22 Jul 2020 12:17)     admitted for dyspnea - evaluate for CHF and   for metastatic disease hx breast ca / rt mastectomy .    INTERVAL HPI/OVERNIGHT EVENTS: Pt was seen and examined at the bedside this morning.   pt   reports that she is uncomfortable because she still has shortness of breath and palpitations and pt is not having chest pain/ pain on respiration but can feel her heart racing.           REVIEW OF SYSTEMS:  CONSTITUTIONAL: No fever or chills  HEENT: No headache  RESPIRATORY: +Shortness of breath. No cough, wheezing.  CARDIOVASCULAR: No chest pain, + Palpitations  GASTROINTESTINAL: No abd pain, nausea, vomiting, or diarrhea  GENITOURINARY: No dysuria, frequency, or hematuria  NEUROLOGICAL: No focal weakness or dizziness  MUSCULOSKELETAL: No myalgias     Vital Signs Last 24 Hrs  T(C): 36.9 (22 Jul 2020 08:49), Max: 37.5 (22 Jul 2020 05:40)  T(F): 98.4 (22 Jul 2020 08:49), Max: 99.5 (22 Jul 2020 05:40)  HR: 116 (22 Jul 2020 10:17) (108 - 137)  BP: 107/72 (22 Jul 2020 10:17) (92/63 - 132/85)  BP(mean): --  RR: 17 (22 Jul 2020 08:49) (16 - 22)  SpO2: 99% (22 Jul 2020 08:49) (95% - 100%)    PHYSICAL EXAM:  GENERAL: NAD, awake and alert.  HEENT:  anicteric, moist mucous membranes , rt breast mastectomy.  CHEST/LUNG:  CTA b/l, no rales, wheezes, or rhonchi  HEART:  RRR, S1, S2, tachycardic.  ABDOMEN:  soft, nontender, nondistended ,   EXTREMITIES: no edema, cyanosis, or calf tenderness, +DP b/l  NERVOUS SYSTEM: aa0/ 3  answers questions and follows commands appropriately , motor intact all 4ext .  gu intact   MEDICATIONS  (STANDING):  enalapril 20 milliGRAM(s) Oral daily  enoxaparin Injectable 40 milliGRAM(s) SubCutaneous at bedtime    MEDICATIONS  (PRN):      LABS:                        7.5    13.52 )-----------( 258      ( 22 Jul 2020 07:12 )             23.5     CBC Full  -  ( 22 Jul 2020 07:12 )  WBC Count : 13.52 K/uL  Hemoglobin : 7.5 g/dL  Hematocrit : 23.5 %  Platelet Count - Automated : 258 K/uL  Mean Cell Volume : 86.1 fl  Mean Cell Hemoglobin : 27.5 pg  Mean Cell Hemoglobin Concentration : 31.9 gm/dL  Auto Neutrophil # : 10.83 K/uL  Auto Lymphocyte # : 1.05 K/uL  Auto Monocyte # : 1.20 K/uL  Auto Eosinophil # : 0.01 K/uL  Auto Basophil # : 0.03 K/uL  Auto Neutrophil % : 80.0 %  Auto Lymphocyte % : 7.8 %  Auto Monocyte % : 8.9 %  Auto Eosinophil % : 0.1 %  Auto Basophil % : 0.2 %    22 Jul 2020 07:12    133    |  99     |  15     ----------------------------<  131    3.6     |  26     |  0.62     Ca    10.5       22 Jul 2020 07:12  Phos  2.4       22 Jul 2020 07:12  Mg     1.9       22 Jul 2020 07:12    TPro  5.5    /  Alb  1.7    /  TBili  1.1    /  DBili  x      /  AST  85     /  ALT  60     /  AlkPhos  535    22 Jul 2020 07:12              RADIOLOGY & ADDITIONAL TESTS:    Personally reviewed.     Consultant(s) Notes Reviewed:  [x] YES  [ ] NO

## 2020-07-22 NOTE — ED PROVIDER NOTE - OBJECTIVE STATEMENT
54 year old female with a history of tachycardia, breast CA, HTN presents with SOB and LE edema.  Patient is a transfer from [facility].  Patient presented for b/l LE swelling and 1 week and SOB x 1 day with orthopnea.  Patient was treated for her breast CA last year, states final chemo treatment was in May 2019.  At [place], she was found to have WBC 19 and CTA showing right pleural effusion, lesion on liver suspicious for mets, and lytic lesion on rib.  She received lasix in the ED. No history of CHF, denies n/v/d/f, headache, chest pain.  States she has suffered from tachycardia since her breast surgery.

## 2020-07-22 NOTE — H&P ADULT - NSHPPHYSICALEXAM_GEN_ALL_CORE
Vital Signs Last 24 Hrs  T(C): 36.4 (22 Jul 2020 03:00), Max: 36.4 (21 Jul 2020 23:47)  T(F): 97.6 (22 Jul 2020 03:00), Max: 97.6 (21 Jul 2020 23:47)  HR: 116 (22 Jul 2020 03:00) (108 - 123)  BP: 113/72 (22 Jul 2020 03:00) (92/63 - 132/74)  BP(mean): --  RR: 22 (22 Jul 2020 03:00) (16 - 22)  SpO2: 99% (22 Jul 2020 03:00) (99% - 100%)    PHYSICAL EXAM:  GENERAL: Patient laying in bed w/ mild labored respirations with speaking  HEAD:  Atraumatic, Normocephalic  EYES: EOMI, PERRLA, conjunctiva and sclera clear  ENT: Moist mucous membranes  CHEST/LUNG: No rales, rhonchi, wheezing, or rubs. Resp rate of 19, decreased breath sounds on the R  HEART: Tachycardic, HR of 108. Regular rhythm; No murmurs, rubs, or gallops  ABDOMEN: Bowel sounds present; Soft, Nontender. Mildly distended.  EXTREMITIES:  2+ Peripheral Pulses, brisk capillary refill. No clubbing, cyanosis, or edema  NERVOUS SYSTEM:  Alert & Oriented X3, speech clear. No deficits   MSK: FROM all 4 extremities, full and equal strength PHYSICAL EXAM:  T(C): 36.4 (22 Jul 2020 03:00), Max: 36.4 (21 Jul 2020 23:47)  T(F): 97.6 (22 Jul 2020 03:00), Max: 97.6 (21 Jul 2020 23:47)  HR: 116 (22 Jul 2020 03:00) (108 - 123)  BP: 113/72 (22 Jul 2020 03:00) (92/63 - 132/74)  RR: 22 (22 Jul 2020 03:00) (16 - 22)  SpO2: 99% (22 Jul 2020 03:00) (99% - 100%)    GENERAL: Patient laying in bed w/ mild labored respirations with speaking  HEAD:  Atraumatic, Normocephalic  EYES: EOMI, PERRLA, conjunctiva and sclera clear  ENT: Moist mucous membranes  CHEST/LUNG: No rhonchi, wheezing, or rubs. Resp rate of 19, decreased breath sounds on the R  HEART: Tachycardic, HR of 108 on tele monitor. Regular rhythm; No murmurs, rubs, or gallops  ABDOMEN: Bowel sounds present; Soft, Nontender. Mildly distended.  EXTREMITIES:  2+ Peripheral Pulses, brisk capillary refill. No clubbing, cyanosis, or edema  NERVOUS SYSTEM:  Alert & Oriented X3, speech clear. No deficits   MSK: FROM all 4 extremities, full and equal strength

## 2020-07-22 NOTE — ED ADULT NURSE REASSESSMENT NOTE - NS ED NURSE REASSESS COMMENT FT1
Patient ambulated to bathroom at this time with steady gait. Cardiac monitor placed for transfer to floor. Denies any complaints. Transferred with RN and tech at this time

## 2020-07-22 NOTE — H&P ADULT - PROBLEM SELECTOR PLAN 1
Hx of breast CA s/p mastectomy, radiation, and chemo   -CTA showed possible liver and rib mets  -Concern for maligancy. Heme/onc, Dr. Guy, consulted. Recs appreciated  -evaluate for CHF. TTE ordered   -Stacie w/ Lasix Hx of breast CA s/p mastectomy, radiation, and chemo   -CTA showed possible liver and rib mets  -Concern for metastatic dz. Heme/onc, Dr. Guy, consulted. Recs appreciated  -evaluate for CHF. TTE ordered   -Diuresed w/ Lasix

## 2020-07-22 NOTE — PROGRESS NOTE ADULT - PROBLEM SELECTOR PLAN 2
Hx of breast CA s/p mastectomy, radiation, and chemo in 2019  -patient followed by Heme/onc, Dr. Ramos  -Concern for metastatic dz to liver.   -Dr. Jesus ([facility]); Suspected liver mets Suspect bone mets R 10th rib. Hx of breast CA s/p mastectomy, radiation, and chemo in 2019  -patient followed by Heme/onc, Dr. Ramos  -Concern for metastatic dz to liver. LFTs elevated.   -Dr. Jesus ([facility]); Suspected liver mets Suspect bone mets R 10th rib.

## 2020-07-22 NOTE — H&P ADULT - PROBLEM SELECTOR PLAN 2
Hx of breast CA s/p mastectomy, radiation, and chemo in 2019  -patient followed by Heme/onc, Dr. Ramos  -Heme/onc, Dr. Guy, consulted

## 2020-07-22 NOTE — PROGRESS NOTE ADULT - ASSESSMENT
55yo F with PMH of breast CA s/p R mastectomy, radiation, chemo (final treatment May 2019), HTN, and tachycardia presents to the ED for worsening SOB, BALLARD, and BLE edema x1 week. Patient is being admitted for dyspnea - evaluate for CHF. Concern for metastatic disease. 53yo F with PMH of breast CA s/p R mastectomy, radiation, chemo (final treatment May 2019), HTN, and tachycardia presents to the ED for worsening SOB, BALLARD, and BLE edema x1 week. Patient is being admitted for dyspnea - evaluate for CHF and   for metastatic disease.

## 2020-07-22 NOTE — CONSULT NOTE ADULT - ASSESSMENT
[ASSESSMENT and  PLAN]  55 yo Chinese F (Jeremy) with PMD of triple negative breast cancer, dx Spring 2019  s/p neoadjuvant chemotx with taxol x2 then AC x4.   But with no response to chemo per pt  s/p R MRM and ALND with 1/21LN involved    Followed by adjuvant XRT.   thereafter on observation  In march noted chest wall abn, s/p bx with scar tissue    Admitted with tachycardia  BALLARD. Elevated AlkPhos.   Abn Liver findings.     Moderate anemia due to chronic disease  Hgb 7    Leukocytosis of unclear cause.   ? reactive WBC 13.5  Had bandemia with >10% bands, WBC 19, now improved      RECOMMENDATIONS  Transfuse PRBC as clinically indicated.   Transfuse PRBC if Hgb <7.0 or if symptomatic.   Follow CBC    Check Anemia studies.     DVT Prophyalxis    Thank you for consulting us.     I have discussed the above plan of care with patient/family in detail. They expressed understanding of the treatment plan . Risks, benefits and alternatives discussed in detail. I have asked if they have any questions or concerns and appropriately addressed them; all questions answered to their satisfactions and in lay terms.     Discussed with  xxxxxxx.    > xxxxxx minutes spent in direct patient care, examining and counseling patient,  reviewing  the notes, lab data/ imaging , discussion with multidisciplinary team. [ASSESSMENT and  PLAN]  53 yo Chinese F Jeremy with PMD of triple negative breast cancer, dx Spring 2019  s/p neoadjuvant chemotx with taxol x2 then AC x4.   But with no response to chemo per pt  s/p R MRM and ALND with 1/21LN involved    Followed by adjuvant XRT.   thereafter on observation  In march noted chest wall abn, s/p bx with scar tissue    Admitted with tachycardia  BALLARD. Elevated AlkPhos.   Abn Liver findings.   Suspected liver mets    Suspect bone mets R 10th rib    Moderate anemia due to chronic disease  Hgb 7.   ? other cause, eg bleeding    Leukocytosis of unclear cause.   ? reactive WBC 13.5  Had bandemia with >10% bands, WBC 19, now improved    Small R effusion    RECOMMENDATIONS  Transfuse PRBC as clinically indicated.   Transfuse PRBC if Hgb <7.0 or if symptomatic.   Follow CBC    Check Anemia studies.   b12, folate, ferritin, ESR, CRP, retic    Check MRI abd with IV contrast  Check AFP  Check HBV serologies  Check CEA, and CA27-29, 15-3.     DVT Prophylaxis  Lovenox    Thank you for consulting us.     I have discussed the above plan of care with patient in detail. They expressed understanding of the treatment plan . Risks, benefits and alternatives discussed in detail. I have asked if they have any questions or concerns and appropriately addressed them; all questions answered to their satisfactions and in lay terms.     > 60 minutes spent in direct patient care, examining and counseling patient,  reviewing  the notes, lab data/ imaging , discussion with multidisciplinary team.

## 2020-07-22 NOTE — H&P ADULT - PROBLEM SELECTOR PLAN 4
Patient w/ hx of tachycardia after R mastectomy  -continue to monitor Patient w/ hx of tachycardia after R mastectomy  -continue to monitor on remote telemetry

## 2020-07-22 NOTE — ED PROVIDER NOTE - CLINICAL SUMMARY MEDICAL DECISION MAKING FREE TEXT BOX
54 year old female with breast CA p/w SOB and b/l LE edema.  Transferred from [place] where CT showed pleural effusion and likely metastatic disease

## 2020-07-23 ENCOUNTER — TRANSCRIPTION ENCOUNTER (OUTPATIENT)
Age: 55
End: 2020-07-23

## 2020-07-23 LAB
ALBUMIN SERPL ELPH-MCNC: 2 G/DL — LOW (ref 3.3–5)
ALP SERPL-CCNC: 570 U/L — HIGH (ref 40–120)
ALT FLD-CCNC: 61 U/L — SIGNIFICANT CHANGE UP (ref 12–78)
ANION GAP SERPL CALC-SCNC: 8 MMOL/L — SIGNIFICANT CHANGE UP (ref 5–17)
APTT BLD: 26.5 SEC — LOW (ref 27.5–35.5)
AST SERPL-CCNC: 79 U/L — HIGH (ref 15–37)
BILIRUB SERPL-MCNC: 1.1 MG/DL — SIGNIFICANT CHANGE UP (ref 0.2–1.2)
BUN SERPL-MCNC: 13 MG/DL — SIGNIFICANT CHANGE UP (ref 7–23)
CALCIUM SERPL-MCNC: 11.7 MG/DL — HIGH (ref 8.5–10.1)
CANCER AG27-29 SERPL-ACNC: 191.5 U/ML — HIGH (ref 0–38.6)
CHLORIDE SERPL-SCNC: 103 MMOL/L — SIGNIFICANT CHANGE UP (ref 96–108)
CO2 SERPL-SCNC: 25 MMOL/L — SIGNIFICANT CHANGE UP (ref 22–31)
CREAT SERPL-MCNC: 0.51 MG/DL — SIGNIFICANT CHANGE UP (ref 0.5–1.3)
GLUCOSE SERPL-MCNC: 105 MG/DL — HIGH (ref 70–99)
HBV CORE AB SER-ACNC: SIGNIFICANT CHANGE UP
HBV SURFACE AB SER-ACNC: SIGNIFICANT CHANGE UP
HBV SURFACE AG SER-ACNC: SIGNIFICANT CHANGE UP
HCG UR QL: NEGATIVE — SIGNIFICANT CHANGE UP
HCT VFR BLD CALC: 27.1 % — LOW (ref 34.5–45)
HCV AB S/CO SERPL IA: 0.09 S/CO — SIGNIFICANT CHANGE UP (ref 0–0.99)
HCV AB SERPL-IMP: SIGNIFICANT CHANGE UP
HGB BLD-MCNC: 8.6 G/DL — LOW (ref 11.5–15.5)
INR BLD: 1.39 RATIO — HIGH (ref 0.88–1.16)
MCHC RBC-ENTMCNC: 27.6 PG — SIGNIFICANT CHANGE UP (ref 27–34)
MCHC RBC-ENTMCNC: 31.7 GM/DL — LOW (ref 32–36)
MCV RBC AUTO: 86.9 FL — SIGNIFICANT CHANGE UP (ref 80–100)
NRBC # BLD: 0 /100 WBCS — SIGNIFICANT CHANGE UP (ref 0–0)
OB PNL STL: NEGATIVE — SIGNIFICANT CHANGE UP
PLATELET # BLD AUTO: 292 K/UL — SIGNIFICANT CHANGE UP (ref 150–400)
POTASSIUM SERPL-MCNC: 3.8 MMOL/L — SIGNIFICANT CHANGE UP (ref 3.5–5.3)
POTASSIUM SERPL-SCNC: 3.8 MMOL/L — SIGNIFICANT CHANGE UP (ref 3.5–5.3)
PROT SERPL-MCNC: 6.2 G/DL — SIGNIFICANT CHANGE UP (ref 6–8.3)
PROTHROM AB SERPL-ACNC: 16 SEC — HIGH (ref 10.6–13.6)
RBC # BLD: 3.12 M/UL — LOW (ref 3.8–5.2)
RBC # FLD: 17.5 % — HIGH (ref 10.3–14.5)
SODIUM SERPL-SCNC: 136 MMOL/L — SIGNIFICANT CHANGE UP (ref 135–145)
WBC # BLD: 15.59 K/UL — HIGH (ref 3.8–10.5)
WBC # FLD AUTO: 15.59 K/UL — HIGH (ref 3.8–10.5)

## 2020-07-23 PROCEDURE — 93306 TTE W/DOPPLER COMPLETE: CPT | Mod: 26

## 2020-07-23 PROCEDURE — 99223 1ST HOSP IP/OBS HIGH 75: CPT

## 2020-07-23 PROCEDURE — 99233 SBSQ HOSP IP/OBS HIGH 50: CPT | Mod: GC

## 2020-07-23 RX ORDER — METOPROLOL TARTRATE 50 MG
12.5 TABLET ORAL DAILY
Refills: 0 | Status: DISCONTINUED | OUTPATIENT
Start: 2020-07-23 | End: 2020-07-23

## 2020-07-23 RX ORDER — METOPROLOL TARTRATE 50 MG
12.5 TABLET ORAL DAILY
Refills: 0 | Status: DISCONTINUED | OUTPATIENT
Start: 2020-07-23 | End: 2020-07-24

## 2020-07-23 RX ADMIN — Medication 12.5 MILLIGRAM(S): at 09:37

## 2020-07-23 NOTE — PROGRESS NOTE ADULT - PROBLEM SELECTOR PLAN 4
Patient w/ hx of tachycardia after R mastectomy  -Toprol XL 12.5 mg qd ordered.   -continue to monitor on remote telemetry,   -monitor vitals (BP).

## 2020-07-23 NOTE — DISCHARGE NOTE PROVIDER - CARE PROVIDERS DIRECT ADDRESSES
,DirectAddress_Unknown,[email or web address] ,DirectAddress_Unknown,[email or web address],DirectAddress_Unknown

## 2020-07-23 NOTE — DISCHARGE NOTE PROVIDER - PROVIDER TOKENS
PROVIDER:[TOKEN:[[id number]]],PROVIDER:[TOKEN:[[id number]]],PROVIDER:[TOKEN:[[id number]]] PROVIDER:[TOKEN:[[id number]]],PROVIDER:[TOKEN:[[id number]]],FREE:[LAST:[Blaine],PHONE:[phone]],FAX:[(   [phone]    -],ADDRESS:[PCP]]

## 2020-07-23 NOTE — PROGRESS NOTE ADULT - SUBJECTIVE AND OBJECTIVE BOX
Patient is a 54y old  Female who presents with a chief complaint of SOB and BLE edema (23 Jul 2020 10:45)    admitted for dyspnea - evaluate for CHF and for metastatic disease hx breast ca / rt mastectomy .    INTERVAL HPI/OVERNIGHT EVENTS: Pt was seen and examined at the bedside this morning. The pt reported that she felt dizziness on interview this morning, She reports history of discomfort while sleeping on her right side, thought might be secondary to pleural effusions. She also reports 3 month history of fatigue that she was experiencing this morning. The nurse alerted that HR was >130 on tele.     MEDICATIONS  (STANDING):  enalapril 20 milliGRAM(s) Oral daily  enoxaparin Injectable 40 milliGRAM(s) SubCutaneous at bedtime  metoprolol succinate ER 12.5 milliGRAM(s) Oral daily    MEDICATIONS  (PRN):      Allergies    amoxicillin (Hives)    Intolerances        REVIEW OF SYSTEMS:  CONSTITUTIONAL: No fever or chills. + Fatigue  HEENT: No headache  RESPIRATORY: No SOB,  No cough, No wheezing.  CARDIOVASCULAR: No chest pain, no palpitations.  GASTROINTESTINAL: No abd pain, nausea, vomiting, or diarrhea  GENITOURINARY: No dysuria, frequency, or hematuria  NEUROLOGICAL: + dizziness.   MUSCULOSKELETAL: No myalgias     Vital Signs Last 24 Hrs  T(C): 36.4 (23 Jul 2020 05:16), Max: 36.4 (22 Jul 2020 20:23)  T(F): 97.6 (23 Jul 2020 05:16), Max: 97.6 (22 Jul 2020 20:23)  HR: 140 (23 Jul 2020 08:52) (85 - 140)  BP: 108/74 (23 Jul 2020 08:38) (107/75 - 129/69)  BP(mean): --  RR: 16 (23 Jul 2020 08:38) (16 - 17)  SpO2: 99% (23 Jul 2020 05:16) (98% - 99%)    PHYSICAL EXAM:  GENERAL: NAD, awake and alert.  HEENT:  anicteric, moist mucous membranes , rt breast mastectomy.  CHEST/LUNG:  CTA b/l, no rales, wheezes, or rhonchi  HEART:  RRR, S1, S2, tachycardic.  ABDOMEN:  soft, nontender, nondistended ,   EXTREMITIES: no edema, cyanosis, or calf tenderness, +DP b/l  NERVOUS SYSTEM: aa0x 3 answers questions and follows commands appropriately , motor intact all 4ext .  gu intact     LABS:                        8.6    15.59 )-----------( 292      ( 23 Jul 2020 09:17 )             27.1     CBC Full  -  ( 23 Jul 2020 09:17 )  WBC Count : 15.59 K/uL  Hemoglobin : 8.6 g/dL  Hematocrit : 27.1 %  Platelet Count - Automated : 292 K/uL  Mean Cell Volume : 86.9 fl  Mean Cell Hemoglobin : 27.6 pg  Mean Cell Hemoglobin Concentration : 31.7 gm/dL  Auto Neutrophil # : x  Auto Lymphocyte # : x  Auto Monocyte # : x  Auto Eosinophil # : x  Auto Basophil # : x  Auto Neutrophil % : x  Auto Lymphocyte % : x  Auto Monocyte % : x  Auto Eosinophil % : x  Auto Basophil % : x    23 Jul 2020 09:17    136    |  103    |  13     ----------------------------<  105    3.8     |  25     |  0.51     Ca    11.7       23 Jul 2020 09:17    TPro  6.2    /  Alb  2.0    /  TBili  1.1    /  DBili  x      /  AST  79     /  ALT  61     /  AlkPhos  570    23 Jul 2020 09:17        CAPILLARY BLOOD GLUCOSE              RADIOLOGY & ADDITIONAL TESTS:    Personally reviewed.     Consultant(s) Notes Reviewed:  [x] YES  [ ] NO Patient is a 54y old  Female who presents with a chief complaint of SOB and BLE edema (23 Jul 2020 10:45)    admitted for dyspnea - evaluate for CHF and for metastatic disease hx breast ca / rt mastectomy .    INTERVAL HPI/OVERNIGHT EVENTS: Pt was seen and examined at the bedside this morning. The pt reported that she felt dizziness on interview this morning, She reports history of discomfort while sleeping on her right side, thought might be secondary to pleural effusions. She also reports 3 month history of fatigue that she was experiencing this morning. The nurse alerted that HR was >130 on tele.               REVIEW OF SYSTEMS:  CONSTITUTIONAL: No fever or chills. + Fatigue  HEENT: No headache  RESPIRATORY: No SOB,  No cough, No wheezing.  CARDIOVASCULAR: No chest pain, no palpitations.  GASTROINTESTINAL: No abd pain, nausea, vomiting, or diarrhea  GENITOURINARY: No dysuria, frequency, or hematuria  NEUROLOGICAL: + dizziness.   MUSCULOSKELETAL: No myalgias     Vital Signs Last 24 Hrs  T(C): 36.4 (23 Jul 2020 05:16), Max: 36.4 (22 Jul 2020 20:23)  T(F): 97.6 (23 Jul 2020 05:16), Max: 97.6 (22 Jul 2020 20:23)  HR: 140 (23 Jul 2020 08:52) (85 - 140)  BP: 108/74 (23 Jul 2020 08:38) (107/75 - 129/69)  BP(mean): --  RR: 16 (23 Jul 2020 08:38) (16 - 17)  SpO2: 99% (23 Jul 2020 05:16) (98% - 99%)    PHYSICAL EXAM:  GENERAL: NAD, awake and alert.  HEENT:  anicteric, moist mucous membranes , rt breast mastectomy.  CHEST/LUNG:  CTA b/l, no rales, wheezes, or rhonchi  HEART:  RRR, S1, S2, tachycardic.  ABDOMEN:  soft, nontender, nondistended ,   EXTREMITIES: no edema, cyanosis, or calf tenderness, +DP b/l  NERVOUS SYSTEM: aa0x 3 answers questions and follows commands appropriately , motor intact all 4ext .  gu intact     MEDICATIONS  (STANDING):  enalapril 20 milliGRAM(s) Oral daily  enoxaparin Injectable 40 milliGRAM(s) SubCutaneous at bedtime  metoprolol succinate ER 12.5 milliGRAM(s) Oral daily    MEDICATIONS  (PRN):  LABS:                        8.6    15.59 )-----------( 292      ( 23 Jul 2020 09:17 )             27.1     CBC Full  -  ( 23 Jul 2020 09:17 )  WBC Count : 15.59 K/uL  Hemoglobin : 8.6 g/dL  Hematocrit : 27.1 %  Platelet Count - Automated : 292 K/uL  Mean Cell Volume : 86.9 fl  Mean Cell Hemoglobin : 27.6 pg  Mean Cell Hemoglobin Concentration : 31.7 gm/dL  Auto Neutrophil # : x  Auto Lymphocyte # : x  Auto Monocyte # : x  Auto Eosinophil # : x  Auto Basophil # : x  Auto Neutrophil % : x  Auto Lymphocyte % : x  Auto Monocyte % : x  Auto Eosinophil % : x  Auto Basophil % : x    23 Jul 2020 09:17    136    |  103    |  13     ----------------------------<  105    3.8     |  25     |  0.51     Ca    11.7       23 Jul 2020 09:17    TPro  6.2    /  Alb  2.0    /  TBili  1.1    /  DBili  x      /  AST  79     /  ALT  61     /  AlkPhos  570    23 Jul 2020 09:17        CAPILLARY BLOOD GLUCOSE              RADIOLOGY & ADDITIONAL TESTS:    Personally reviewed.     Consultant(s) Notes Reviewed:  [x] YES  [ ] NO

## 2020-07-23 NOTE — PROGRESS NOTE ADULT - ASSESSMENT
53yo F with PMH of breast CA s/p R mastectomy, radiation, chemo (final treatment May 2019), HTN, and tachycardia presents to the ED for worsening SOB, BALLARD, and BLE edema x1 week. Patient is being admitted for dyspnea - evaluate for CHF and for metastatic disease. Liver biopsy with IR planned 7/24.

## 2020-07-23 NOTE — DISCHARGE NOTE PROVIDER - HOSPITAL COURSE
HPI:    55yo F with PMH of breast CA s/p R mastectomy, radiation, chemo (final treatment May 2019), HTN, and tachycardia presents to the ED for worsening SOB, BALLARD, and BLE edema x1 week. Patient states that for the past 2mo, she has been experiencing mild dyspnea on exertion. Over the past week, she notes significant worsening of her sx. Patient has no prior hx of CHF. Patient was transferred from [place] ED. RR was 21 and HR was 128. Patient states that she has been tachycardic at baseline since her mastectomy last year. She was found to have a WBC count of 19.3, proBNP of 355, alk phos 539, and AST/ALT of 93 and 68. CTA was performed and showed R pleural effusion, 5mm ground glass nodules in RUL, and suspicious liver and rib mets. Patient was given Lasix 40mg and 1L NS in the ED.         In the ED:    Vitals:  and RR 20. All other vitals stable    CBC: WBC count 14.5, H&H 7.5/23.3    Chem: Albumin 1.6, Alk phos 441, AST 73, ALT 55 (22 Jul 2020 03:01)            ---    HOSPITAL COURSE:         ---    CONSULTANTS:         ---    TIME SPENT:    I, the attending physician, was physically present for the key portions of the evaluation and management (E/M) service provided. The total amount of time spent reviewing the hospital notes, laboratory values, imaging findings, assessing/counseling the patient, discussing with consultant physicians, social work, nursing staff was -- minutes        ---    Primary care provider was made aware of plan for discharge:      [  ] NO     [  ] YES HPI:    55yo F with PMH of breast CA s/p R mastectomy, radiation, chemo (final treatment May 2019), HTN, and tachycardia presents to the ED for worsening SOB, BALLARD, and BLE edema x1 week. Patient states that for the past 2mo, she has been experiencing mild dyspnea on exertion. Over the past week, she notes significant worsening of her sx. Patient has no prior hx of CHF. Patient was transferred from [place] ED. RR was 21 and HR was 128. Patient states that she has been tachycardic at baseline since her mastectomy last year. She was found to have a WBC count of 19.3, proBNP of 355, alk phos 539, and AST/ALT of 93 and 68. CTA was performed and showed R pleural effusion, 5mm ground glass nodules in RUL, and suspicious liver and rib mets. No PE was found. Patient was given Lasix 40mg and 1L NS in the ED.         In the ED:    Vitals:  and RR 20. All other vitals stable    CBC: WBC count 14.5, H&H 7.5/23.3    Chem: Albumin 1.6, Alk phos 441, AST 73, ALT 55 (22 Jul 2020 03:01)            HOSPITAL COURSE:     The pt was transferred to the floor and was placed on remote tele. The CTA was negative for DVT, however a doppler was done for b/l leg swelling, the doppler was negative. the pt was treated with lasix with some improvement. Due to episodes of palpitations and tachycardia on 7/22, IV Lopressor 5mg IVP x1 was administered. Toprol 12.5 mg qd was ordered for maintenance. Heme/onc was consulted for possible metastatic dz of previous breast cancer. Heme ordered breast cancer antigens CEA, Ca 15.3, Ca 27.29 which were all positive. A MRA was ordered to FU liver lesions seen on CT, it revealed numerous lesions in the liver. The pt was scheduled to undergo liver biopsy on 7/24 with IR.          ---    CONSULTANTS:     Heme/onc: Dr Jesus/Malena    Cardio: Dr Nobles    ---    TIME SPENT:    I, the attending physician, was physically present for the key portions of the evaluation and management (E/M) service provided. The total amount of time spent reviewing the hospital notes, laboratory values, imaging findings, assessing/counseling the patient, discussing with consultant physicians, social work, nursing staff was -- minutes        ---    Primary care provider was made aware of plan for discharge:      [  ] NO     [  ] YES HPI:    53yo F with PMH of breast CA s/p R mastectomy, radiation, chemo (final treatment May 2019), HTN, and tachycardia presents to the ED for worsening SOB, BALLARD, and BLE edema x1 week. Patient states that for the past 2mo, she has been experiencing mild dyspnea on exertion. Over the past week, she notes significant worsening of her sx. Patient has no prior hx of CHF. Patient was transferred from [place] ED. RR was 21 and HR was 128. Patient states that she has been tachycardic at baseline since her mastectomy last year. She was found to have a WBC count of 19.3, proBNP of 355, alk phos 539, and AST/ALT of 93 and 68. CTA was performed and showed R pleural effusion, 5mm ground glass nodules in RUL, and suspicious liver and rib mets. No PE was found. Patient was given Lasix 40mg and 1L NS in the ED.         In the ED:    Vitals:  and RR 20. All other vitals stable    CBC: WBC count 14.5, H&H 7.5/23.3    Chem: Albumin 1.6, Alk phos 441, AST 73, ALT 55 (22 Jul 2020 03:01)            HOSPITAL COURSE:     Patient admitted for dyspnea, R/o CHF and workup for liver lesions suspicious for metastasis. CTA was negative for PE but showed possible mets to liver and rib; LE doppler was done for b/l leg swelling which was negative for DVT but was found to have chronic appearing organized hematoma in the distal right calf measuring approximately 3.9 x 1.9 cm. Due to episodes of palpitations and tachycardia on 7/22, IV Lopressor 5mg IVP x1 was administered. Cardio was consulted and patient started Toprol 12.5 mg qd for sinus tachycardia which was titrated up to 25mg qd. Echo was performed and grossly WNL. Heme/onc was consulted for possible metastatic dz of previous breast cancer. Heme ordered breast cancer antigens CEA, cancer Ag 15.3, and cancer AG 27.29 were all positive. A MR abdomen was ordered to FU liver lesions seen on CT which it revealed numerous lesions in the liver. Patient underwent liver biopsy on 7/24 with IR. You developed hypercalcemia likely 2/2 malignancy which improved with IVF, calcitonin, and biphosphonates. You had mild hyponatremia on day of DC and were treated with IVF. Patient's SOB improved with pain control and improvement of heart rate. Patient seen and examined on day of DC, and is stable for DC home.          ---    CONSULTANTS:     Heme/onc: Dr Jesus/Malena    Cardio: Dr Nobles    ---    TIME SPENT:    I, the attending physician, was physically present for the key portions of the evaluation and management (E/M) service provided. The total amount of time spent reviewing the hospital notes, laboratory values, imaging findings, assessing/counseling the patient, discussing with consultant physicians, social work, nursing staff was -- minutes HPI:    55yo F with PMH of breast CA s/p R mastectomy, radiation, chemo (final treatment May 2019), HTN, and tachycardia presents to the ED for worsening SOB, BALLARD, and BLE edema x1 week. Patient states that for the past 2mo, she has been experiencing mild dyspnea on exertion. Over the past week, she notes significant worsening of her sx. Patient has no prior hx of CHF. Patient was transferred from [place] ED. RR was 21 and HR was 128. Patient states that she has been tachycardic at baseline since her mastectomy last year. She was found to have a WBC count of 19.3, proBNP of 355, alk phos 539, and AST/ALT of 93 and 68. CTA was performed and showed R pleural effusion, 5mm ground glass nodules in RUL, and suspicious liver and rib mets. No PE was found. Patient was given Lasix 40mg and 1L NS in the ED.     )            HOSPITAL COURSE:     Patient admitted for dyspnea, R/o CHF and workup for liver lesions suspicious for metastasis. CTA was negative for PE but showed possible mets to liver and rib; LE doppler was done for b/l leg swelling which was negative for DVT but was found to have chronic appearing organized hematoma in the distal right calf measuring approximately 3.9 x 1.9 cm. Due to episodes of palpitations and tachycardia on 7/22, IV Lopressor 5mg IVP x1 was administered. Cardio was consulted and patient started Toprol 12.5 mg qd for sinus tachycardia which was titrated up to 25mg qd. Echo was performed and grossly WNL  , no chf this time ruled  out . Heme/onc was consulted for possible metastatic dz of previous breast cancer. Heme ordered breast cancer antigens CEA, cancer Ag 15.3, and cancer AG 27.29 were all positive.     A MR abdomen was ordered to FU liver lesions seen on CT which it revealed numerous lesions in the liver. Patient underwent liver biopsy on 7/24 with IR. You developed hypercalcemia likely 2/2 malignancy which improved with IVF, calcitonin, and biphosphonates. You had mild hyponatremia   sec to decrease po intacke       and were treated with IVF  . liver possible mets s/p ir guided biopsy pending result      Patient's SOB improved with pain control and improvement of heart rate       so this time dyspnea  was sec to  upper quadrant pain sec to liver mets .      pt was evaluate for CHF   echo wnl ruled out  chf    as per cardio   and   for metastatic disease  but ct angio  no lung mets  and no PE but         . mild transaminitis - possible liver mets mri abd/pelvis. high lactate sec to cancer no sign / symptom of infection. anemia of chr dis sec to cancer - stable  fu up hb / hct    , fobt neg  no gi bleed .    pt clear by hem /onc and cardio .     Patient seen and examined on day of DC,   medically stable for DC home.     physical exam  7-25-20  day of dc    Vital Signs Last 24 Hrs    T(C): 37.2 (25 Jul 2020 05:18), Max: 37.2 (25 Jul 2020 05:18)    T(F): 99 (25 Jul 2020 05:18), Max: 99 (25 Jul 2020 05:18)    HR: 100 (25 Jul 2020 05:18) (100 - 107)    BP: 117/73 (25 Jul 2020 05:18) (117/73 - 119/81)    BP(mean): --    RR: 17 (25 Jul 2020 05:18) (17 - 17)    SpO2: 96% (25 Jul 2020 05:18) (96% - 97%) GEN no distress , HEENT nt/nc , perrla , CVS s1s2 no tachy , CHEST bl air entery  present , no wheezing  , no rale   , CNS aao/3 , no focal deficit   motor intact 5/5 all 4ext  , GI soft , bs present , no tenderness   , liver biopsy  site intact , no focal deficit  motor / sensory  intact   , EXT no edema, pedal pulse present , SKIN no rash.        CONSULTANTS:     Heme/onc: Dr Jesus/Malena    Cardio: Dr Nobles    ---    total time spend 55 min.

## 2020-07-23 NOTE — PROGRESS NOTE ADULT - ASSESSMENT
[ASSESSMENT and  PLAN]  53 yo Chinese F Jeremy with PMD of triple negative breast cancer, dx Spring 2019  s/p neoadjuvant chemotx with taxol x2 then AC x4.   But with no response to chemo per pt  s/p R MRM and ALND with 1/21LN involved    Followed by adjuvant XRT.   thereafter on observation  In march noted chest wall abn, s/p bx with scar tissue    Admitted with tachycardia  BALLARD. Elevated AlkPhos.   Abn Liver findings.   MRI with probable metastatic disease  Suspect bone mets R 10th rib    Moderate anemia due to chronic disease  Hgb 7.   ? other cause, eg bleeding    Leukocytosis of unclear cause.   ? reactive WBC 13.5  Had bandemia with >10% bands, WBC 19, now improved    Small R effusion    RECOMMENDATIONS  Transfuse PRBC as clinically indicated.   Transfuse PRBC if Hgb <7.0 or if symptomatic.   Follow CBC    Check Anemia studies.   b12, folate, ferritin, ESR, CRP, retic    disccussed with patient. to have IR review films and if can be biopsied by tomorrow will do.. will not keep patient in hospital over the weekend.  can be arranged as outpatient either through us or back at [facility] with Dr. Carroll

## 2020-07-23 NOTE — PROGRESS NOTE ADULT - PROBLEM SELECTOR PLAN 2
Hx of breast CA s/p mastectomy, radiation, and chemo in 2019  -patient followed by [facility]/onc, Dr. Ramos  -Concern for metastatic dz to liver. LFTs elevated.   -MRA abdomen: multiple lesions in the liver, possible common bile duct compression, ascites and mild pleural effusion.   -Dr. Jesus ([facility]); Positive antigens CEA, Ca 15.3, Ca 27.29, Plan for liver biopsy with IR 7/24.  -NPO after midnight, Hold lovenox.

## 2020-07-23 NOTE — DISCHARGE NOTE PROVIDER - NSDCCPCAREPLAN_GEN_ALL_CORE_FT
PRINCIPAL DISCHARGE DIAGNOSIS  Diagnosis: Breast cancer metastasized to liver  Assessment and Plan of Treatment: You have a history of breast cancer. CTA showed possible metastasis to liver and rib. MRI abdomen confirmed lesions on your liver. Your tumor markers were elevated on blood work. You had liver biopsy on 7/24, results are still pending.  -Please follow up with heme/onc (either Dr. Guy's group who saw you at \[facility]\"" or your outpatient oncologist) for follow up within 1 week of DC for biopsy results and further treatment as needed  -Please take 0.5 tablet of percocet as prescribed for pain. If pain is mild, may take acetaminophen instead every 6 hours.  -Please do not take motrin or NSAIDs      SECONDARY DISCHARGE DIAGNOSES  Diagnosis: Dyspnea  Assessment and Plan of Treatment: You had SOB on admission which resolved with better pain and heart rate control. CTA chest performend and showed no clots in the lungs. Lower extremity doppler showed no clot in the legs but you were found to have a chronic hematoma.    Diagnosis: Tachycardia  Assessment and Plan of Treatment: You had elevated heart rate, likely due to pain and anemia. Echo was performed and within normal limits. You were started on metoprolol for heart rate control.  Please continue to take metoprolol 25mg daily   Follow up with PCP and cardiologist within 1 week of DC.      Diagnosis: Hypercalcemia  Assessment and Plan of Treatment: You were found to have elevated calcium likely due to cancer. You were given, IV fluids, calcitonin and biphosphonate with improvement in level. Please follow up with PCP within 1 week to recheck level.    Diagnosis: Hyponatremia  Assessment and Plan of Treatment: You had mild hyponatremia (low Na) on day of discharge. You were given IVF.  Please follow up with PCP within 1 week to recheck level.    Diagnosis: Hypertension  Assessment and Plan of Treatment: You were previously on lisinopril for HTN. Your blood pressures were well controlled here  Please stop taking lisinopril, you were started on metoprolol instead.  Please continue to take metoprolol 25mg daily   Follow up with PCP and cardiologist within 1 week of DC.    Diagnosis: Anemia  Assessment and Plan of Treatment: You have anemia, your hemoglobin levels were low but stable. You were found to have a chronic hematoma on lower extremity likely 2/2 to trauma.  Follow with PCP to monitor CBC within 1 week of DC.

## 2020-07-23 NOTE — PROGRESS NOTE ADULT - PROBLEM SELECTOR PLAN 5
Pt w/ history of hypertension. BP is within normal range.   -c/w enalapril 20mg QD Pt w/ history of hypertension. BP is within normal range.   - on low dose bb  dc  ace  inhibitor.

## 2020-07-23 NOTE — PROGRESS NOTE ADULT - SUBJECTIVE AND OBJECTIVE BOX
Interval History:  no new complaints  Chart reviewed and events noted;   Overnight events:    MEDICATIONS  (STANDING):  enalapril 20 milliGRAM(s) Oral daily  enoxaparin Injectable 40 milliGRAM(s) SubCutaneous at bedtime  metoprolol succinate ER 12.5 milliGRAM(s) Oral daily    MEDICATIONS  (PRN):      Vital Signs Last 24 Hrs  T(C): 36.4 (23 Jul 2020 05:16), Max: 36.4 (22 Jul 2020 20:23)  T(F): 97.6 (23 Jul 2020 05:16), Max: 97.6 (22 Jul 2020 20:23)  HR: 140 (23 Jul 2020 08:52) (85 - 140)  BP: 108/74 (23 Jul 2020 08:38) (107/75 - 129/69)  BP(mean): --  RR: 16 (23 Jul 2020 08:38) (16 - 17)  SpO2: 99% (23 Jul 2020 05:16) (98% - 99%)    PHYSICAL EXAM  General: adult in NAD  HEENT: clear oropharynx, anicteric sclera, pink conjunctivae  Neck: supple  CV: normal S1S2 with no murmur rubs or gallops  Lungs: clear to auscultation, no wheezes, no rhales  Abdomen: soft non-tender non-distended, no hepato/splenomegaly  Ext: no clubbing cyanosis or edema  Skin: no rashes and no petichiae  Neuro: alert and oriented X3 no focal deficits      LABS:  CBC Full  -  ( 23 Jul 2020 09:17 )  WBC Count : 15.59 K/uL  RBC Count : 3.12 M/uL  Hemoglobin : 8.6 g/dL  Hematocrit : 27.1 %  Platelet Count - Automated : 292 K/uL  Mean Cell Volume : 86.9 fl  Mean Cell Hemoglobin : 27.6 pg  Mean Cell Hemoglobin Concentration : 31.7 gm/dL  Auto Neutrophil # : x  Auto Lymphocyte # : x  Auto Monocyte # : x  Auto Eosinophil # : x  Auto Basophil # : x  Auto Neutrophil % : x  Auto Lymphocyte % : x  Auto Monocyte % : x  Auto Eosinophil % : x  Auto Basophil % : x    07-23    136  |  103  |  13  ----------------------------<  105<H>  3.8   |  25  |  0.51    Ca    11.7<H>      23 Jul 2020 09:17  Phos  2.4     07-22  Mg     1.9     07-22    TPro  6.2  /  Alb  2.0<L>  /  TBili  1.1  /  DBili  x   /  AST  79<H>  /  ALT  61  /  AlkPhos  570<H>  07-23        fe studies  Iron - Total Binding Capacity.: 159 ug/dL (07-22 @ 19:38)  Ferritin, Serum: 7272 ng/mL (07-22 @ 19:35)      WBC trend  15.59 K/uL (07-23-20 @ 09:17)  13.52 K/uL (07-22-20 @ 07:12)  14.51 K/uL (07-22-20 @ 00:16)  19.33 K/uL (07-21-20 @ 20:34)      Hgb trend  8.6 g/dL (07-23-20 @ 09:17)  7.5 g/dL (07-22-20 @ 07:12)  7.5 g/dL (07-22-20 @ 00:16)  8.9 g/dL (07-21-20 @ 20:34)      plt trend  292 K/uL (07-23-20 @ 09:17)  258 K/uL (07-22-20 @ 07:12)  265 K/uL (07-22-20 @ 00:16)  315 K/uL (07-21-20 @ 20:34)        RADIOLOGY & ADDITIONAL STUDIES:    < from: MR Abdomen w/wo IV Cont (07.22.20 @ 14:56) >    EXAM:  MR ABDOMEN WAW IC                            PROCEDURE DATE:  07/22/2020          INTERPRETATION:  Abdominal MR without and with IV contrast including MRCP    Indication: History of breast cancer    Technique: Utilizing a 1.5 Christina high-field magnet, multiplanar multisequence MR images of the abdomen are acquired without and with IV contrast ( 7cc Gadavist administered, 3 cc discarded), supplemented by thin and thick slab MRCP images. Postcontrast images are acquired dynamically.    Comparison: None.    Findings: Innumerable mildly T2 hyperintense ring-enhancing lesions are seen throughout the liver with the largest measuring up to 9.0 cm in the right hepatic lobe, suspicious for metastasis. The gallbladder is not visualized, probably due to contraction or prior cholecystectomy. The proximal common bile duct is not visualized, probably due to compression by a 1.2 cm mildly enlarged periportal lymph node at the roman hepatis. No evidence for intrahepatic biliary ductal dilatation.    The pancreas, spleen, adrenals and kidneys appear unremarkable.    Small right ascites. The visualized bowel structures appear grossly unremarkable.    Limited imaging through the lower chest demonstrate a mild right pleural effusion.    Impression: Multiple lesions in the liver as described above, suspicious for metastasis.    The proximal common bile duct is not visualized, probably due to compression by a 1.2 cm mildly enlarged periportal lymph node at the roman hepatis. No evidence for intrahepatic biliary ductal dilatation.    Small ascites.    Mild right pleural effusion.                MAURISIO SEWELL M.D., ATTENDING RADIOLOGIST  This document has been electronically signed. Jul 22 2020  3:31PM                < end of copied text >

## 2020-07-23 NOTE — PROGRESS NOTE ADULT - PROBLEM SELECTOR PLAN 1
Now resolved.  Hx of breast CA s/p mastectomy, radiation, and chemo   -CTA showed possible liver and rib mets.   -Pt reports biopsy in March 2020 at [facility] confirmed scar tissue in the ribs.  -evaluate for CHF. FU TTE.  -Jamir w/ Lasix Now resolved.  Hx of breast CA s/p mastectomy, radiation, and chemo   -CTA showed possible liver and rib mets.   -Pt reports biopsy in March 2020 at [facility] confirmed scar tissue in the ribs.  -FU TTE.  -Jamir buck/ Lasix

## 2020-07-23 NOTE — DIETITIAN INITIAL EVALUATION ADULT. - ADD RECOMMEND
1) Continue with current DASH/TLC diet, 2) Pt's food preferences obtained and will be honored, 3) Pt encouraged adequate PO intake, 4) Monitor pt's PO intake, weight, skin, edema, GI distress

## 2020-07-23 NOTE — DISCHARGE NOTE PROVIDER - CARE PROVIDER_API CALL
Tye Gyu  HEMATOLOGY  [address] [place] [address]  Phone: [phone]  Fax: [phone]  Follow Up Time:     John Nobles  CARDIOVASCULAR DISEASE  [address] [place] [address]  Phone: [phone]  Fax: [phone]  Follow Up Time:     Hon RUBINA Jesus  HEMATOLOGY  [address] [place] [address]  Phone: [phone]  Fax: [phone]  Follow Up Time: Tye Guy I  HEMATOLOGY  [address] [facility] [address]  [place] [address]  Phone: [phone]  Fax: [phone]  Follow Up Time:     John Nobles  CARDIOVASCULAR DISEASE  [address] [place] [address]  Phone: [phone]  Fax: [phone]  Follow Up Time:     GARCÍA Valladares  Phone: [phone]  Fax: (   )    -  Follow Up Time:

## 2020-07-23 NOTE — DIETITIAN INITIAL EVALUATION ADULT. - PROBLEM SELECTOR PLAN 1
Hx of breast CA s/p mastectomy, radiation, and chemo   -CTA showed possible liver and rib mets  -Concern for metastatic dz. Heme/onc, Dr. Guy, consulted. Recs appreciated  -evaluate for CHF. TTE ordered   -Diuresed w/ Lasix

## 2020-07-23 NOTE — DIETITIAN INITIAL EVALUATION ADULT. - OTHER INFO
As per chart pt is a 54 year old female with a PMH of breast CA s/p R mastectomy, radiation, chemo (final treatment May 2019), HTN, and tachycardia presents to the ED for worsening SOB, BALLARD, and BLE edema x1 week. Patient is being admitted for dyspnea - evaluate for CHF and for metastatic disease. Liver biopsy with IR planned 7/24.    Pt reports decreased appetite and PO intake PTA which she states is secondary to "her health'", however states that occasionally she has a good PO intake, unsure of time frame of how long her appetite has been decreased but states that it has been a while. Denies following any dietary restrictions PTA. Supplement use PTA includes Vitamin D. NKFA.    Pt reports currently good appetite and PO intake, per chart noted to consume 100% of meal in house. Admission weight of 155lbs. Pt reports current weight and UBW of 150lbs, stable, denies any significant recent weight changes. Pt appears well nourished. Denies any chewing/ swallowing difficulties. Denies any nausea/ vomiting/ diarrhea/ constipation, +BM 7/23.     +2 b/l ankle edema   No pressure injuries noted at this time

## 2020-07-23 NOTE — CONSULT NOTE ADULT - ASSESSMENT
53yo F with PMH of breast CA s/p R mastectomy, radiation, chemo (final treatment May 2019), HTN, and tachycardia presents to the ED for worsening SOB, BALLARD, and BLE edema x1 week. Cardio consulted for persistent tachycardia, SOB, b/l LE edema    Edema    tachycardia    - Patient is not complaining of any cardiac symptoms at this time.  - No clear evidence of acute ischemia, trops negative x 2. Will follow up third set.  - His CKs are flat, suggesting against acute atherosclerotic plaque rupture.  - Biomarker trend is not consistent with plaque rupture but rather demand ischemia. Monitor closely for the development of anginal symptoms or clinical signs of ischemia.   - No acute changes on EKG compared to previous.  - No meaningful evidence of volume overload.  - Previous TTE shows ___.  - BP well controlled, monitor routine hemodynamics.  - Continue ___.  - Monitor and replete lytes, keep K>4, Mg>2.  - Strict I/Os, daily weights.  - Pt has no active ischemia, decompensated heart failure, unstable arrythmia, or severe stenotic valvular disease, and has __ cardiac risk factors. In the setting of low risk ___, he/she is optimized from cardiovascular standpoint to proceed with planned procedure with routine hemodynamic monitoring.   - Pt has no modifiable active cardiac risk factors and in the setting of low risk _____, patient is optimized as best as possible from cardiovascular standpoint to proceed with planned procedure with routine hemodynamic monitoring.  - Other cardiovascular workup will depend on clinical course.  - All other workup per primary team.  - Will continue to follow. 55yo F with PMH of breast CA s/p R mastectomy, radiation, chemo (final treatment May 2019), HTN, and tachycardia presents to the ED for worsening SOB, BALLARD, and BLE edema x1 week. Cardio consulted for persistent tachycardia, SOB, b/l LE edema    Dyspnea  - Pt c/o of feeling SOB, likely 2/2 pleural effusion  - cxr: No lung consolidations. Small right pleural effusion.  - pt doesn't seem overloaded  - Strict I/Os, daily weights.    Tachycardia  - pt states shes been chronically tachycardic since her mastectomy 1 yo  - EKG: sinus tachycardia  - troponins 0.11 on admission  - continue toprolol 12.5mg QD  - repeat trops to peak  - Monitor and replete lytes, keep K>4, Mg>2.    HTN  - BP well controlled, monitor routine hemodynamics.  - continue vasotec 20mg QD    LE edema  - pt has had swelling in her legs x1 week, denies hx of CHF  - lasix ???    - Other cardiovascular workup will depend on clinical course.  - All other workup per primary team.  - Will continue to follow. 55yo F with PMH of breast CA s/p R mastectomy, radiation, chemo (final treatment May 2019), HTN, and tachycardia presents to the ED for worsening SOB, BALLARD, and BLE edema x1 week. Cardio consulted for persistent tachycardia, SOB, b/l LE edema. Patient has small pleural effusion on cxr, PE unlikely given negative CTA,. Echo today shows mitral valve calcifications, premature valve disease and premature coronary disease, normal LV function, HF likely 2/2 chemotherapy, mildly elevated BNP. Tachycardia likely 2/2 anemia and possible metastatic disease 2/2 multiple liver lesions. LE edema likely 2/2 hypoalbuminemia, dopplers negative, no signs of hematoma.     Dyspnea  - Pt c/o of feeling SOB, likely 2/2 pleural effusion  - cxr: No lung consolidations. Small right pleural effusion.  - pt doesn't seem overloaded  - Strict I/Os, daily weights.    Tachycardia  - pt states shes been chronically tachycardic since her mastectomy 1 yo  - EKG: sinus tachycardia  - troponins 0.11 on admission  - continue toprolol 12.5mg QD  - repeat trops to peak  - Monitor and replete lytes, keep K>4, Mg>2.    HTN  - BP well controlled, monitor routine hemodynamics.  - would recommend discontinuing vasotec 20mg QD    LE edema  - pt has had swelling in her legs x1 week, denies hx of CHF      - Other cardiovascular workup will depend on clinical course.  - All other workup per primary team.  - Will continue to follow.    Recommend:  - abg r/o hypoxemia  - repeat EKG tomorrow  - pulm consult  - tx underlying tachycardia  - pain control   - remote tele 55yo F with PMH of breast CA s/p R mastectomy, radiation, chemo (final treatment May 2019), HTN, and tachycardia presents to the ED for worsening SOB, BALLARD, and BLE edema x1 week. Cardio consulted for persistent tachycardia, SOB, b/l LE edema. Patient has small pleural effusion on cxr, PE unlikely given negative CTA. Echo today shows mitral valve calcifications, premature valve disease and premature coronary disease, normal LV function, HF likely 2/2 chemotherapy, mildly elevated BNP. Sinus tachycardia likely 2/2 anemia and possible metastatic disease 2/2 multiple liver lesions. LE edema likely 2/2 hypoalbuminemia, dopplers negative, no signs of hematoma.     Dyspnea  - Pt c/o of feeling SOB, likely 2/2 pleural effusion  - cxr: No lung consolidations. Small right pleural effusion.  - pt doesn't seem overloaded  - Strict I/Os, daily weights.    Tachycardia  - pt states shes been chronically tachycardic since her mastectomy 1 yo  - EKG: sinus tachycardia  - troponins 0.11 on admission  - continue toprolol 12.5mg QD  - repeat trops to peak  - Monitor and replete lytes, keep K>4, Mg>2.    HTN  - BP well controlled, monitor routine hemodynamics.  - would recommend discontinuing vasotec 20mg QD    LE edema  - pt has had swelling in her legs x1 week, denies hx of CHF      - Other cardiovascular workup will depend on clinical course.  - All other workup per primary team.  - Will continue to follow.    Recommend:  - abg r/o hypoxemia  - repeat EKG tomorrow  - pulm consult  - tx underlying tachycardia  - pain control   - remote tele  - 55yo F with PMH of breast CA s/p R mastectomy, radiation, chemo (final treatment May 2019), HTN, and tachycardia presents to the ED for worsening SOB, BALLARD, and BLE edema x1 week.     Cardio consulted for persistent tachycardia, SOB, b/l LE edema. Patient has small pleural effusion on cxr, PE unlikely given negative CTA. Echo today shows mitral valve calcifications, premature valve disease and premature coronary disease, normal LV function, HF less likely, unsure if calcifications due to chemo/ radiation.  Sinus tachycardia likely 2/2 anemia and possible metastatic disease 2/2 multiple liver lesions. LE edema likely 2/2 hypoalbuminemia, dopplers negative for DVT with R calf hematoma.    Recommend:  - ABG r/o hypoxemia  - repeat EKG in AM when HR more well controlled  - regarding tachycardia, would determine underlying cause and treat. Can continue with metoprolol for now in setting of HTN  - pain control  - continue to monitor on remote tele x 24 hours and can dc if no events  - pulm consult      - Other cardiovascular workup will depend on clinical course.  - All other workup per primary team.  - Will continue to follow.

## 2020-07-23 NOTE — CONSULT NOTE ADULT - SUBJECTIVE AND OBJECTIVE BOX
Patient is a 54y old  Female who presents with a chief complaint of SOB and BLE edema (23 Jul 2020 11:33)      HPI:  53yo F with PMH of breast CA s/p R mastectomy, radiation, chemo (final treatment May 2019), HTN, and tachycardia presents to the ED for worsening SOB, BALLARD, and BLE edema x1 week. Patient states that for the past 2mo, she has been experiencing mild dyspnea on exertion. Over the past week, she notes significant worsening of her sx. Patient has no prior hx of CHF. Patient was transferred from [place] ED. RR was 21 and HR was 128. Patient states that she has been tachycardic at baseline since her mastectomy last year. She was found to have a WBC count of 19.3, proBNP of 355, alk phos 539, and AST/ALT of 93 and 68. CTA was performed and showed R pleural effusion, 5mm ground glass nodules in RUL, and suspicious liver and rib mets. Patient was given Lasix 40mg and 1L NS in the ED.     In the ED:  Vitals:  and RR 20. All other vitals stable  CBC: WBC count 14.5, H&H 7.5/23.3  Chem: Albumin 1.6, Alk phos 441, AST 73, ALT 55 (22 Jul 2020 03:01)      PAST MEDICAL & SURGICAL HISTORY:  Breast cancer: s/p R mastectomy, radiation, and chemotherapy  Hypertension  HTN (hypertension)  H/O mastectomy, right: 2019  H/O mastectomy, right            ECHO  FINDINGS:      MEDICATIONS  (STANDING):  metoprolol succinate ER 12.5 milliGRAM(s) Oral daily    MEDICATIONS  (PRN):      FAMILY HISTORY:  FH: diabetes mellitus: Mother    Denies Family history of CAD or early MI      Constitutional: denies fever, chills  HEENT: denies blurry vision, difficulty hearing  Respiratory: denies SOB, BALLARD, cough  Cardiovascular: denies CP, palpitations, orthopnea, PND, LE edema  Gastrointestinal: denies nausea, vomiting, abdominal pain  Genitourinary: denies urinary changes  Skin: Denies rashes, itching  Neurologic: denies headache, weakness, dizziness  Hematology/Oncology: denies bleeding, easy bruising  ROS negative except as noted above      SOCIAL HISTORY:    No tobacco, Alcohol or Ddrug use    Vital Signs Last 24 Hrs  T(C): 36.4 (23 Jul 2020 12:32), Max: 36.4 (22 Jul 2020 20:23)  T(F): 97.5 (23 Jul 2020 12:32), Max: 97.6 (22 Jul 2020 20:23)  HR: 117 (23 Jul 2020 12:32) (85 - 140)  BP: 113/82 (23 Jul 2020 12:32) (107/75 - 129/69)  BP(mean): --  RR: 16 (23 Jul 2020 12:32) (16 - 17)  SpO2: 98% (23 Jul 2020 12:32) (98% - 99%)    Physical Exam:  General: Well developed, well nourished, NAD  HEENT: NCAT, PERRLA, EOMI bl, moist mucous membranes   Neck: Supple, nontender, no mass  Neurology: A&Ox3, nonfocal, sensation intact   Respiratory: CTA B/L, No W/R/R  CV: RRR, +S1/S2, no murmurs, rubs or gallops  Abdominal: Soft, NT, ND +BSx4, no palpable masses  Extremities: No C/C/E, + peripheral pulses  MSK: Normal ROM, no joint erythema or warmth, no joint swelling   Heme: No obvious ecchymosis or petechiae   Skin: warm, dry, normal color      ECG:    I&O's Detail      LABS:                        8.6    15.59 )-----------( 292      ( 23 Jul 2020 09:17 )             27.1     07-23    136  |  103  |  13  ----------------------------<  105<H>  3.8   |  25  |  0.51    Ca    11.7<H>      23 Jul 2020 09:17  Phos  2.4     07-22  Mg     1.9     07-22    TPro  6.2  /  Alb  2.0<L>  /  TBili  1.1  /  DBili  x   /  AST  79<H>  /  ALT  61  /  AlkPhos  570<H>  07-23    CARDIAC MARKERS ( 21 Jul 2020 20:34 )  .011 ng/mL / x     / 238 U/L / x     / x          PT/INR - ( 23 Jul 2020 12:07 )   PT: 16.0 sec;   INR: 1.39 ratio         PTT - ( 23 Jul 2020 12:07 )  PTT:26.5 sec    I&O's Summary    BNP  RADIOLOGY & ADDITIONAL STUDIES: CHARTING IN PROGRESS    Patient is a 54y old  Female who presents with a chief complaint of SOB and BLE edema (23 Jul 2020 11:33)      HPI:  53yo F with PMH of breast CA s/p R mastectomy, radiation, chemo (final treatment May 2019), HTN, and tachycardia presents to the ED for worsening SOB, BALLARD, and BLE edema x1 week. Patient states that for the past 2mo, she has been experiencing mild dyspnea on exertion. Over the past week, she notes significant worsening of her sx. Patient has no prior hx of CHF. Patient was transferred from [place] ED. RR was 21 and HR was 128. Patient states that she has been tachycardic at baseline since her mastectomy last year. She was found to have a WBC count of 19.3, proBNP of 355, alk phos 539, and AST/ALT of 93 and 68. CTA was performed and showed R pleural effusion, 5mm ground glass nodules in RUL, and suspicious liver and rib mets. Patient was given Lasix 40mg and 1L NS in the ED.     In the ED:  Vitals:  and RR 20. All other vitals stable  CBC: WBC count 14.5, H&H 7.5/23.3  Chem: Albumin 1.6, Alk phos 441, AST 73, ALT 55 (22 Jul 2020 03:01)      PAST MEDICAL & SURGICAL HISTORY:  Breast cancer: s/p R mastectomy, radiation, and chemotherapy  Hypertension  HTN (hypertension)  H/O mastectomy, right: 2019  H/O mastectomy, right            ECHO  FINDINGS:      MEDICATIONS  (STANDING):  metoprolol succinate ER 12.5 milliGRAM(s) Oral daily    MEDICATIONS  (PRN):      FAMILY HISTORY:  FH: diabetes mellitus: Mother    Denies Family history of CAD or early MI      Constitutional: denies fever, chills  HEENT: denies blurry vision, difficulty hearing  Respiratory: admits to SOB  Cardiovascular: palpitations, orthopnea, PND, admits LE edema, pleuritic cp, BALLARD  Gastrointestinal: denies nausea, vomiting, abdominal pain  Genitourinary: denies urinary changes  Skin: Denies rashes, itching  Neurologic: denies headache, weakness, dizziness  Hematology/Oncology: denies bleeding, easy bruising  ROS negative except as noted above      SOCIAL HISTORY:    No tobacco, Alcohol or Ddrug use    Vital Signs Last 24 Hrs  T(C): 36.4 (23 Jul 2020 12:32), Max: 36.4 (22 Jul 2020 20:23)  T(F): 97.5 (23 Jul 2020 12:32), Max: 97.6 (22 Jul 2020 20:23)  HR: 117 (23 Jul 2020 12:32) (85 - 140)  BP: 113/82 (23 Jul 2020 12:32) (107/75 - 129/69)  BP(mean): --  RR: 16 (23 Jul 2020 12:32) (16 - 17)  SpO2: 98% (23 Jul 2020 12:32) (98% - 99%)    Physical Exam:  General: Well developed, well nourished, NAD  HEENT: NCAT, PERRLA, EOMI bl, moist mucous membranes   Neck: Supple, nontender, no mass  Neurology: A&Ox3, nonfocal, sensation intact   Respiratory: CTA B/L, No W/R/R  CV: RRR, +S1/S2, no murmurs, rubs or gallops  Abdominal: Soft, NT, ND +BSx4, no palpable masses  Extremities: No C/C, + peripheral pulses, +1 b/l edema  MSK: Normal ROM, no joint erythema or warmth, no joint swelling   Heme: No obvious ecchymosis or petechiae   Skin: warm, dry, normal color      ECG:    I&O's Detail      LABS:                        8.6    15.59 )-----------( 292      ( 23 Jul 2020 09:17 )             27.1     07-23    136  |  103  |  13  ----------------------------<  105<H>  3.8   |  25  |  0.51    Ca    11.7<H>      23 Jul 2020 09:17  Phos  2.4     07-22  Mg     1.9     07-22    TPro  6.2  /  Alb  2.0<L>  /  TBili  1.1  /  DBili  x   /  AST  79<H>  /  ALT  61  /  AlkPhos  570<H>  07-23    CARDIAC MARKERS ( 21 Jul 2020 20:34 )  .011 ng/mL / x     / 238 U/L / x     / x          PT/INR - ( 23 Jul 2020 12:07 )   PT: 16.0 sec;   INR: 1.39 ratio         PTT - ( 23 Jul 2020 12:07 )  PTT:26.5 sec    I&O's Summary    BNP  RADIOLOGY & ADDITIONAL STUDIES: CHARTING IN PROGRESS    Patient is a 54y old  Female who presents with a chief complaint of SOB and BLE edema (23 Jul 2020 11:33)      HPI:  55yo F with PMH of breast CA s/p R mastectomy, radiation, chemo (final treatment May 2019), HTN, and tachycardia presents to the ED for worsening SOB, BALLARD, and BLE edema x1 week. Patient states that for the past 2mo, she has been experiencing mild dyspnea on exertion. Over the past week, she notes significant worsening of her sx. Patient has no prior hx of CHF. Patient was transferred from [place] ED. RR was 21 and HR was 128. Patient states that she has been tachycardic at baseline since her mastectomy last year. She was found to have a WBC count of 19.3, proBNP of 355, alk phos 539, and AST/ALT of 93 and 68. CTA was performed and showed R pleural effusion, 5mm ground glass nodules in RUL, and suspicious liver and rib mets. Patient was given Lasix 40mg and 1L NS in the ED.     In the ED:  Vitals:  and RR 20. All other vitals stable  CBC: WBC count 14.5, H&H 7.5/23.3  Chem: Albumin 1.6, Alk phos 441, AST 73, ALT 55 (22 Jul 2020 03:01)    PAST MEDICAL & SURGICAL HISTORY:  Breast cancer: s/p R mastectomy, radiation, and chemotherapy  Hypertension  HTN (hypertension)  H/O mastectomy, right: 2019  H/O mastectomy, right    MEDICATIONS  (STANDING):  metoprolol succinate ER 12.5 milliGRAM(s) Oral daily    MEDICATIONS  (PRN):    FAMILY HISTORY:  FH: diabetes mellitus: Mother  Denies Family history of CAD or early MI    Constitutional: denies fever, chills  HEENT: denies blurry vision, difficulty hearing  Respiratory: admits to SOB  Cardiovascular: palpitations, orthopnea, PND, admits LE edema, pleuritic cp, BALLARD  Gastrointestinal: denies nausea, vomiting, abdominal pain  Genitourinary: denies urinary changes  Skin: Denies rashes, itching  Neurologic: denies headache, weakness, dizziness  Hematology/Oncology: denies bleeding, easy bruising  ROS negative except as noted above      SOCIAL HISTORY:  No tobacco, Alcohol or Ddrug use    Vital Signs Last 24 Hrs  T(C): 36.4 (23 Jul 2020 12:32), Max: 36.4 (22 Jul 2020 20:23)  T(F): 97.5 (23 Jul 2020 12:32), Max: 97.6 (22 Jul 2020 20:23)  HR: 117 (23 Jul 2020 12:32) (85 - 140)  BP: 113/82 (23 Jul 2020 12:32) (107/75 - 129/69)  RR: 16 (23 Jul 2020 12:32) (16 - 17)  SpO2: 98% (23 Jul 2020 12:32) (98% - 99%)    Physical Exam:  General: Well developed, well nourished, NAD  HEENT: NCAT, PERRLA, EOMI bl, moist mucous membranes   Neck: Supple, nontender, no mass  Neurology: A&Ox3, nonfocal, sensation intact   Respiratory: CTA B/L, No W/R/R  CV: RRR, +S1/S2, no murmurs, rubs or gallops  Abdominal: Soft, NT, ND +BSx4, no palpable masses  Extremities: No C/C, + peripheral pulses, +1 b/l edema  MSK: Normal ROM, no joint erythema or warmth, no joint swelling   Heme: No obvious ecchymosis or petechiae   Skin: warm, dry, normal color      ECG: sinus tachycardia    I&O's Detail      LABS:                        8.6    15.59 )-----------( 292      ( 23 Jul 2020 09:17 )             27.1     07-23    136  |  103  |  13  ----------------------------<  105<H>  3.8   |  25  |  0.51    Ca    11.7<H>      23 Jul 2020 09:17  Phos  2.4     07-22  Mg     1.9     07-22    TPro  6.2  /  Alb  2.0<L>  /  TBili  1.1  /  DBili  x   /  AST  79<H>  /  ALT  61  /  AlkPhos  570<H>  07-23    CARDIAC MARKERS ( 21 Jul 2020 20:34 )  .011 ng/mL / x     / 238 U/L / x     / x          PT/INR - ( 23 Jul 2020 12:07 )   PT: 16.0 sec;   INR: 1.39 ratio         PTT - ( 23 Jul 2020 12:07 )  PTT:26.5 sec    I&O's Summary    BNP  RADIOLOGY & ADDITIONAL STUDIES: Patient is a 54y old  Female who presents with a chief complaint of SOB and BLE edema (23 Jul 2020 11:33)      HPI:  55yo F with PMH of breast CA s/p R mastectomy, radiation, chemo (final treatment May 2019), HTN, and tachycardia presents to the ED for worsening SOB, BALLARD, and BLE edema x1 week. Patient states that for the past 2mo, she has been experiencing mild dyspnea on exertion. Over the past week, she notes significant worsening of her sx. Patient has no prior hx of CHF. Patient was transferred from [place] ED. RR was 21 and HR was 128. Patient states that she has been tachycardic at baseline since her mastectomy last year. She was found to have a WBC count of 19.3, proBNP of 355, alk phos 539, and AST/ALT of 93 and 68. CTA was performed and showed R pleural effusion, 5mm ground glass nodules in RUL, and suspicious liver and rib mets. Patient was given Lasix 40mg and 1L NS in the ED.     In the ED:  Vitals:  and RR 20. All other vitals stable  CBC: WBC count 14.5, H&H 7.5/23.3  Chem: Albumin 1.6, Alk phos 441, AST 73, ALT 55 (22 Jul 2020 03:01)    PAST MEDICAL & SURGICAL HISTORY:  Breast cancer: s/p R mastectomy, radiation, and chemotherapy  Hypertension  HTN (hypertension)  H/O mastectomy, right: 2019  H/O mastectomy, right    MEDICATIONS  (STANDING):  metoprolol succinate ER 12.5 milliGRAM(s) Oral daily    MEDICATIONS  (PRN):    FAMILY HISTORY:  FH: diabetes mellitus: Mother  Denies Family history of CAD or early MI    Constitutional: denies fever, chills  HEENT: denies blurry vision, difficulty hearing  Respiratory: admits to SOB  Cardiovascular: palpitations, orthopnea, PND, admits LE edema, pleuritic cp, BALLARD  Gastrointestinal: denies nausea, vomiting, abdominal pain  Genitourinary: denies urinary changes  Skin: Denies rashes, itching  Neurologic: denies headache, weakness, dizziness  Hematology/Oncology: denies bleeding, easy bruising  ROS negative except as noted above      SOCIAL HISTORY:  No tobacco, Alcohol or Ddrug use    Vital Signs Last 24 Hrs  T(C): 36.4 (23 Jul 2020 12:32), Max: 36.4 (22 Jul 2020 20:23)  T(F): 97.5 (23 Jul 2020 12:32), Max: 97.6 (22 Jul 2020 20:23)  HR: 117 (23 Jul 2020 12:32) (85 - 140)  BP: 113/82 (23 Jul 2020 12:32) (107/75 - 129/69)  RR: 16 (23 Jul 2020 12:32) (16 - 17)  SpO2: 98% (23 Jul 2020 12:32) (98% - 99%)    Physical Exam:  General: Well developed, well nourished, NAD  HEENT: NCAT, PERRLA, EOMI bl, moist mucous membranes   Neck: Supple, nontender, no mass  Neurology: A&Ox3, nonfocal, sensation intact   Respiratory: CTA B/L, No W/R/R  CV: RRR, +S1/S2, no murmurs, rubs or gallops  Abdominal: Soft, NT, ND +BSx4, no palpable masses  Extremities: No C/C, + peripheral pulses, +1 b/l edema  MSK: Normal ROM, no joint erythema or warmth, no joint swelling   Heme: No obvious ecchymosis or petechiae   Skin: warm, dry, normal color      ECG: sinus tachycardia    I&O's Detail      LABS:                        8.6    15.59 )-----------( 292      ( 23 Jul 2020 09:17 )             27.1     07-23    136  |  103  |  13  ----------------------------<  105<H>  3.8   |  25  |  0.51    Ca    11.7<H>      23 Jul 2020 09:17  Phos  2.4     07-22  Mg     1.9     07-22    TPro  6.2  /  Alb  2.0<L>  /  TBili  1.1  /  DBili  x   /  AST  79<H>  /  ALT  61  /  AlkPhos  570<H>  07-23    CARDIAC MARKERS ( 21 Jul 2020 20:34 )  .011 ng/mL / x     / 238 U/L / x     / x          PT/INR - ( 23 Jul 2020 12:07 )   PT: 16.0 sec;   INR: 1.39 ratio         PTT - ( 23 Jul 2020 12:07 )  PTT:26.5 sec    I&O's Summary    BNP  RADIOLOGY & ADDITIONAL STUDIES:

## 2020-07-24 ENCOUNTER — RESULT REVIEW (OUTPATIENT)
Age: 55
End: 2020-07-24

## 2020-07-24 LAB
ALBUMIN SERPL ELPH-MCNC: 1.8 G/DL — LOW (ref 3.3–5)
ALP SERPL-CCNC: 550 U/L — HIGH (ref 40–120)
ALT FLD-CCNC: 56 U/L — SIGNIFICANT CHANGE UP (ref 12–78)
ANION GAP SERPL CALC-SCNC: 7 MMOL/L — SIGNIFICANT CHANGE UP (ref 5–17)
AST SERPL-CCNC: 95 U/L — HIGH (ref 15–37)
BILIRUB SERPL-MCNC: 1 MG/DL — SIGNIFICANT CHANGE UP (ref 0.2–1.2)
BUN SERPL-MCNC: 12 MG/DL — SIGNIFICANT CHANGE UP (ref 7–23)
CALCIUM SERPL-MCNC: 11.3 MG/DL — HIGH (ref 8.4–10.5)
CALCIUM SERPL-MCNC: 11.6 MG/DL — HIGH (ref 8.5–10.1)
CHLORIDE SERPL-SCNC: 101 MMOL/L — SIGNIFICANT CHANGE UP (ref 96–108)
CO2 SERPL-SCNC: 26 MMOL/L — SIGNIFICANT CHANGE UP (ref 22–31)
CREAT SERPL-MCNC: 0.44 MG/DL — LOW (ref 0.5–1.3)
GLUCOSE SERPL-MCNC: 90 MG/DL — SIGNIFICANT CHANGE UP (ref 70–99)
HCT VFR BLD CALC: 26.6 % — LOW (ref 34.5–45)
HGB BLD-MCNC: 8.3 G/DL — LOW (ref 11.5–15.5)
MCHC RBC-ENTMCNC: 27.5 PG — SIGNIFICANT CHANGE UP (ref 27–34)
MCHC RBC-ENTMCNC: 31.2 GM/DL — LOW (ref 32–36)
MCV RBC AUTO: 88.1 FL — SIGNIFICANT CHANGE UP (ref 80–100)
NRBC # BLD: 0 /100 WBCS — SIGNIFICANT CHANGE UP (ref 0–0)
PHOSPHATE SERPL-MCNC: 2.9 MG/DL — SIGNIFICANT CHANGE UP (ref 2.5–4.5)
PLATELET # BLD AUTO: 242 K/UL — SIGNIFICANT CHANGE UP (ref 150–400)
POTASSIUM SERPL-MCNC: 4.1 MMOL/L — SIGNIFICANT CHANGE UP (ref 3.5–5.3)
POTASSIUM SERPL-SCNC: 4.1 MMOL/L — SIGNIFICANT CHANGE UP (ref 3.5–5.3)
PROT SERPL-MCNC: 5.7 G/DL — LOW (ref 6–8.3)
PTH-INTACT FLD-MCNC: 3 PG/ML — LOW (ref 15–65)
RBC # BLD: 3.02 M/UL — LOW (ref 3.8–5.2)
RBC # FLD: 17.8 % — HIGH (ref 10.3–14.5)
SODIUM SERPL-SCNC: 134 MMOL/L — LOW (ref 135–145)
WBC # BLD: 14.87 K/UL — HIGH (ref 3.8–10.5)
WBC # FLD AUTO: 14.87 K/UL — HIGH (ref 3.8–10.5)

## 2020-07-24 PROCEDURE — 88307 TISSUE EXAM BY PATHOLOGIST: CPT | Mod: 26

## 2020-07-24 PROCEDURE — 88360 TUMOR IMMUNOHISTOCHEM/MANUAL: CPT | Mod: 26,59

## 2020-07-24 PROCEDURE — 47000 NEEDLE BIOPSY OF LIVER PERQ: CPT

## 2020-07-24 PROCEDURE — 88334 PATH CONSLTJ SURG CYTO XM EA: CPT | Mod: 26

## 2020-07-24 PROCEDURE — 88341 IMHCHEM/IMCYTCHM EA ADD ANTB: CPT | Mod: 26,59

## 2020-07-24 PROCEDURE — 77012 CT SCAN FOR NEEDLE BIOPSY: CPT | Mod: 26

## 2020-07-24 PROCEDURE — 76705 ECHO EXAM OF ABDOMEN: CPT | Mod: 26

## 2020-07-24 PROCEDURE — 99232 SBSQ HOSP IP/OBS MODERATE 35: CPT

## 2020-07-24 PROCEDURE — 88342 IMHCHEM/IMCYTCHM 1ST ANTB: CPT | Mod: 26,59

## 2020-07-24 PROCEDURE — 99233 SBSQ HOSP IP/OBS HIGH 50: CPT | Mod: GC

## 2020-07-24 PROCEDURE — 93010 ELECTROCARDIOGRAM REPORT: CPT

## 2020-07-24 PROCEDURE — 88333 PATH CONSLTJ SURG CYTO XM 1: CPT | Mod: 26

## 2020-07-24 RX ORDER — ONDANSETRON 8 MG/1
4 TABLET, FILM COATED ORAL THREE TIMES A DAY
Refills: 0 | Status: DISCONTINUED | OUTPATIENT
Start: 2020-07-24 | End: 2020-07-25

## 2020-07-24 RX ORDER — ENOXAPARIN SODIUM 100 MG/ML
40 INJECTION SUBCUTANEOUS DAILY
Refills: 0 | Status: DISCONTINUED | OUTPATIENT
Start: 2020-07-25 | End: 2020-07-25

## 2020-07-24 RX ORDER — METOPROLOL TARTRATE 50 MG
25 TABLET ORAL DAILY
Refills: 0 | Status: DISCONTINUED | OUTPATIENT
Start: 2020-07-24 | End: 2020-07-24

## 2020-07-24 RX ORDER — ZOLEDRONIC ACID 5 MG/100ML
4 INJECTION, SOLUTION INTRAVENOUS ONCE
Refills: 0 | Status: COMPLETED | OUTPATIENT
Start: 2020-07-24 | End: 2020-07-24

## 2020-07-24 RX ORDER — CALCITONIN SALMON 200 [IU]/ML
270 INJECTION, SOLUTION INTRAMUSCULAR EVERY 12 HOURS
Refills: 0 | Status: DISCONTINUED | OUTPATIENT
Start: 2020-07-24 | End: 2020-07-25

## 2020-07-24 RX ORDER — SODIUM CHLORIDE 9 MG/ML
2000 INJECTION, SOLUTION INTRAVENOUS
Refills: 0 | Status: DISCONTINUED | OUTPATIENT
Start: 2020-07-24 | End: 2020-07-25

## 2020-07-24 RX ORDER — CALCITONIN SALMON 200 [IU]/ML
270 INJECTION, SOLUTION INTRAMUSCULAR EVERY 12 HOURS
Refills: 0 | Status: DISCONTINUED | OUTPATIENT
Start: 2020-07-26 | End: 2020-07-25

## 2020-07-24 RX ORDER — IBUPROFEN 200 MG
400 TABLET ORAL ONCE
Refills: 0 | Status: DISCONTINUED | OUTPATIENT
Start: 2020-07-24 | End: 2020-07-24

## 2020-07-24 RX ORDER — SODIUM CHLORIDE 9 MG/ML
1000 INJECTION INTRAMUSCULAR; INTRAVENOUS; SUBCUTANEOUS
Refills: 0 | Status: DISCONTINUED | OUTPATIENT
Start: 2020-07-24 | End: 2020-07-24

## 2020-07-24 RX ORDER — METOPROLOL TARTRATE 50 MG
25 TABLET ORAL DAILY
Refills: 0 | Status: DISCONTINUED | OUTPATIENT
Start: 2020-07-24 | End: 2020-07-25

## 2020-07-24 RX ORDER — OXYCODONE AND ACETAMINOPHEN 5; 325 MG/1; MG/1
1 TABLET ORAL EVERY 6 HOURS
Refills: 0 | Status: DISCONTINUED | OUTPATIENT
Start: 2020-07-24 | End: 2020-07-25

## 2020-07-24 RX ADMIN — Medication 12.5 MILLIGRAM(S): at 06:11

## 2020-07-24 RX ADMIN — OXYCODONE AND ACETAMINOPHEN 1 TABLET(S): 5; 325 TABLET ORAL at 09:20

## 2020-07-24 RX ADMIN — OXYCODONE AND ACETAMINOPHEN 1 TABLET(S): 5; 325 TABLET ORAL at 10:03

## 2020-07-24 RX ADMIN — ZOLEDRONIC ACID 400 MILLIGRAM(S): 5 INJECTION, SOLUTION INTRAVENOUS at 20:38

## 2020-07-24 RX ADMIN — CALCITONIN SALMON 270 INTERNATIONAL UNIT(S): 200 INJECTION, SOLUTION INTRAMUSCULAR at 17:53

## 2020-07-24 RX ADMIN — SODIUM CHLORIDE 100 MILLILITER(S): 9 INJECTION INTRAMUSCULAR; INTRAVENOUS; SUBCUTANEOUS at 18:28

## 2020-07-24 RX ADMIN — SODIUM CHLORIDE 100 MILLILITER(S): 9 INJECTION, SOLUTION INTRAVENOUS at 18:54

## 2020-07-24 NOTE — PROGRESS NOTE ADULT - ASSESSMENT
[ASSESSMENT and  PLAN]  55 yo Chinese F Jeremy with PMH of triple negative breast cancer, dx Spring 2019  s/p neoadjuvant chemotx with taxol x2 then AC x4.   But with no response to chemo per pt  s/p R MRM and ALND with 1/21LN involved    Followed by adjuvant XRT.   Thereafter on observation  In march noted chest wall abn, s/p bx with scar tissue    Admitted with tachycardia. BALLARD.   Elevated AlkPhos. Abn Liver findings.   Suspected liver mets  MRI Abd with innumerable liver lesions  AFP normal. HCV neg. HBV neg    Bone mets R 10th rib    Overall concerning for recurrent breast cancer, liver mets, lung mets.     Hypercalcemia due to malignancy  s/p calcitonin    Moderate anemia due to chronic disease  Hgb 7.   ? other cause, eg bleeding    Leukocytosis of unclear cause.   ? reactive WBC 13.5  Had bandemia with >10% bands, WBC 19, now improved    Small R effusion      RECOMMENDATIONS  Continue calcitonin, DC after 72hrs dose.   Rx Zometa 4mg IV once  IVF with D5W 100cchr x2L. Reassess post hydration.   Monitor lung exam due to prior small pleural effusion  DC NS.   Avoid LAsix, unless volume overload. may worsen hypercalcemia if volume deplete.        Transfuse PRBC as clinically indicated.   Transfuse PRBC if Hgb <7.0 or if symptomatic.   Follow CBC    Followup Anemia studies.   b12, folate,       GI eval given elevated CEA.        DVT Prophylaxis  Lovenox    Thank you for consulting us.     I have discussed the above plan of care with patient in detail. They expressed understanding of the treatment plan . Risks, benefits and alternatives discussed in detail. I have asked if they have any questions or concerns and appropriately addressed them; all questions answered to their satisfactions and in lay terms.

## 2020-07-24 NOTE — PROGRESS NOTE ADULT - PROBLEM SELECTOR PLAN 4
Patient w/ hx of tachycardia after R mastectomy  -Toprol XL 12.5 mg qd ordered.   -continue to monitor on remote telemetry  -monitor vitals (BP).  -Cardio following: repeat EKG ordered. Patient w/ hx of tachycardia after R mastectomy  -Toprol XL 25 mg qd.  -continue to monitor on remote telemetry  -monitor vitals (BP).  -Cardio following: repeat EKG ordered. Patient w/ hx of tachycardia after R mastectomy  -Toprol XL 25 mg qd.  -continue to monitor on remote telemetry  -monitor vitals (BP).  -Cardio following: repeat EKG sinus tachy

## 2020-07-24 NOTE — PROGRESS NOTE ADULT - SUBJECTIVE AND OBJECTIVE BOX
Patient is a 54y old  Female who presents with a chief complaint of SOB and BLE edema (23 Jul 2020 13:00)      admitted for dyspnea - evaluate for CHF and for metastatic disease hx breast ca / rt mastectomy .    INTERVAL HPI/OVERNIGHT EVENTS: Pt was seen and examined at the bedside this morning. The pt reported that she felt dizziness on interview this morning, She reports history of discomfort while sleeping on her right side, thought might be secondary to pleural effusions. This morning she is reporting significant pain and requesting pain control.     MEDICATIONS  (STANDING):  ibuprofen  Tablet. 400 milliGRAM(s) Oral once  metoprolol succinate ER 12.5 milliGRAM(s) Oral daily    MEDICATIONS  (PRN):      Allergies    amoxicillin (Hives)    Intolerances        REVIEW OF SYSTEMS:  CONSTITUTIONAL: No fever or chills. + Fatigue  HEENT: No headache  RESPIRATORY: No SOB,  No cough, No wheezing.  CARDIOVASCULAR: No chest pain, no palpitations.  GASTROINTESTINAL: No abd pain, nausea, vomiting, or diarrhea  GENITOURINARY: No dysuria, frequency, or hematuria  NEUROLOGICAL: + dizziness.   MUSCULOSKELETAL: No myalgias     Vital Signs Last 24 Hrs  T(C): 36.8 (24 Jul 2020 05:14), Max: 37.1 (23 Jul 2020 17:31)  T(F): 98.3 (24 Jul 2020 05:14), Max: 98.8 (23 Jul 2020 17:31)  HR: 98 (24 Jul 2020 05:14) (98 - 140)  BP: 108/77 (24 Jul 2020 05:14) (104/68 - 113/82)  BP(mean): --  RR: 18 (24 Jul 2020 05:14) (16 - 18)  SpO2: 97% (24 Jul 2020 05:14) (96% - 100%)    PHYSICAL EXAM:  GENERAL: In mild distress due to pain, awake and alert  HEENT:  anicteric, moist mucous membranes , rt breast mastectomy.  CHEST/LUNG:  CTA b/l, no rales, wheezes, or rhonchi  HEART:  RRR, S1, S2, tachycardic.  ABDOMEN:  soft, nontender, nondistended   EXTREMITIES: no edema, cyanosis, or calf tenderness, +DP b/l  NERVOUS SYSTEM: aa0x 3 answers questions and follows commands appropriately , motor intact all 4ext .  gu intact     LABS:                        8.6    15.59 )-----------( 292      ( 23 Jul 2020 09:17 )             27.1     CBC Full  -  ( 23 Jul 2020 09:17 )  WBC Count : 15.59 K/uL  Hemoglobin : 8.6 g/dL  Hematocrit : 27.1 %  Platelet Count - Automated : 292 K/uL  Mean Cell Volume : 86.9 fl  Mean Cell Hemoglobin : 27.6 pg  Mean Cell Hemoglobin Concentration : 31.7 gm/dL  Auto Neutrophil # : x  Auto Lymphocyte # : x  Auto Monocyte # : x  Auto Eosinophil # : x  Auto Basophil # : x  Auto Neutrophil % : x  Auto Lymphocyte % : x  Auto Monocyte % : x  Auto Eosinophil % : x  Auto Basophil % : x    23 Jul 2020 09:17    136    |  103    |  13     ----------------------------<  105    3.8     |  25     |  0.51     Ca    11.7       23 Jul 2020 09:17    TPro  6.2    /  Alb  2.0    /  TBili  1.1    /  DBili  x      /  AST  79     /  ALT  61     /  AlkPhos  570    23 Jul 2020 09:17    PT/INR - ( 23 Jul 2020 12:07 )   PT: 16.0 sec;   INR: 1.39 ratio         PTT - ( 23 Jul 2020 12:07 )  PTT:26.5 sec    CAPILLARY BLOOD GLUCOSE            Culture - Blood (collected 07-22-20 @ 12:56)  Source: .Blood Blood-Peripheral  Preliminary Report (07-23-20 @ 13:02):    No growth to date.    Culture - Blood (collected 07-22-20 @ 12:56)  Source: .Blood Blood-Peripheral  Preliminary Report (07-23-20 @ 13:02):    No growth to date.        RADIOLOGY & ADDITIONAL TESTS:    Personally reviewed.     Consultant(s) Notes Reviewed:  [x] YES  [ ] NO Patient is a 54y old  Female who presents with a chief complaint of SOB and BLE edema (23 Jul 2020 13:00)      admitted for dyspnea - evaluate for CHF and for metastatic disease hx breast ca / rt mastectomy .    INTERVAL HPI/OVERNIGHT EVENTS: Pt was seen and examined at the bedside this morning. The pt reported that she felt dizziness on interview this morning, She reports history of discomfort while sleeping on her right side, thought might be secondary to pleural effusions. This morning she is reporting significant pain and requesting pain control.           REVIEW OF SYSTEMS:  CONSTITUTIONAL: No fever or chills. + Fatigue  HEENT: No headache  RESPIRATORY: No SOB,  No cough, No wheezing.  CARDIOVASCULAR: No chest pain, no palpitations.  GASTROINTESTINAL: No abd pain, nausea, vomiting, or diarrhea  GENITOURINARY: No dysuria, frequency, or hematuria  NEUROLOGICAL: + dizziness.   MUSCULOSKELETAL: No myalgias     Vital Signs Last 24 Hrs  T(C): 36.8 (24 Jul 2020 05:14), Max: 37.1 (23 Jul 2020 17:31)  T(F): 98.3 (24 Jul 2020 05:14), Max: 98.8 (23 Jul 2020 17:31)  HR: 98 (24 Jul 2020 05:14) (98 - 140)  BP: 108/77 (24 Jul 2020 05:14) (104/68 - 113/82)  BP(mean): --  RR: 18 (24 Jul 2020 05:14) (16 - 18)  SpO2: 97% (24 Jul 2020 05:14) (96% - 100%)    PHYSICAL EXAM:  GENERAL: In mild distress due to pain, awake and alert  HEENT:  anicteric, moist mucous membranes , rt breast mastectomy.  CHEST/LUNG:  CTA b/l, no rales, wheezes, or rhonchi  HEART:  RRR, S1, S2, tachycardic.  ABDOMEN:  soft, nontender, nondistended   EXTREMITIES: no edema, cyanosis, or calf tenderness, +DP b/l  NERVOUS SYSTEM: aa0x 3 answers questions and follows commands appropriately , motor intact all 4ext .  gu intact     LABS:                        8.6    15.59 )-----------( 292      ( 23 Jul 2020 09:17 )             27.1     CBC Full  -  ( 23 Jul 2020 09:17 )  WBC Count : 15.59 K/uL  Hemoglobin : 8.6 g/dL  Hematocrit : 27.1 %  Platelet Count - Automated : 292 K/uL  Mean Cell Volume : 86.9 fl  Mean Cell Hemoglobin : 27.6 pg  Mean Cell Hemoglobin Concentration : 31.7 gm/dL  Auto Neutrophil # : x  Auto Lymphocyte # : x  Auto Monocyte # : x  Auto Eosinophil # : x  Auto Basophil # : x  Auto Neutrophil % : x  Auto Lymphocyte % : x  Auto Monocyte % : x  Auto Eosinophil % : x  Auto Basophil % : x    23 Jul 2020 09:17    136    |  103    |  13     ----------------------------<  105    3.8     |  25     |  0.51     Ca    11.7       23 Jul 2020 09:17    TPro  6.2    /  Alb  2.0    /  TBili  1.1    /  DBili  x      /  AST  79     /  ALT  61     /  AlkPhos  570    23 Jul 2020 09:17    PT/INR - ( 23 Jul 2020 12:07 )   PT: 16.0 sec;   INR: 1.39 ratio         PTT - ( 23 Jul 2020 12:07 )  PTT:26.5 sec    CAPILLARY BLOOD GLUCOSE            Culture - Blood (collected 07-22-20 @ 12:56)  Source: .Blood Blood-Peripheral  Preliminary Report (07-23-20 @ 13:02):    No growth to date.    Culture - Blood (collected 07-22-20 @ 12:56)  Source: .Blood Blood-Peripheral  Preliminary Report (07-23-20 @ 13:02):    No growth to date.        RADIOLOGY & ADDITIONAL TESTS:    Personally reviewed.     Consultant(s) Notes Reviewed:  [x] YES  [ ] NO

## 2020-07-24 NOTE — PROGRESS NOTE ADULT - PROBLEM SELECTOR PLAN 3
BLE edema w/ no prior hx of CHF  -TTE ordered FU, Doppler negative for DVT  -Diurese w/ lasix BLE edema w/ no prior hx of CHF- no new chf   -TTE ordered FU, Doppler negative for DVT  -Diurese w/ lasix

## 2020-07-24 NOTE — PROGRESS NOTE ADULT - ASSESSMENT
55yo F with PMH of breast CA s/p R mastectomy, radiation, chemo (final treatment May 2019), HTN, and tachycardia presents to the ED for worsening SOB, BALLARD, and BLE edema x1 week.     Cardio consulted for persistent tachycardia, SOB, b/l LE edema. Patient has small pleural effusion on cxr, PE unlikely given negative CTA. Echo today shows mitral valve calcifications, premature valve disease and premature coronary disease, normal LV function, HF less likely, unsure if calcifications due to chemo/ radiation.  Sinus tachycardia likely 2/2 anemia and possible metastatic disease 2/2 multiple liver lesions. LE edema likely 2/2 hypoalbuminemia, dopplers negative for DVT with R calf hematoma.    Tachycardia/SOB  - EKG showed sinus tach, rate 107 with non-specific TWI in lead lll and V1, with poor R wave progression  - Tachycardia is likely reactive to pain, SOB, and anemia  - CTA chest negative of PE but with hepatic lesion and left 10th rib lesion suspicious of metastatic disease  - Rate has been controlled, in NSR.  Can discontinue telemetry  - s/p liver Bx today.  Follow up result  - Resolved.   CTA chest small right pleural effusion.  Comfortable on RA.  - Continue BB    DVT ppx  - Per Primary    - Other cardiovascular workup will depend on clinical course.  - All other workup per primary team.  - Will continue to follow.    Michelle Quintana DNP, NP-C  Cardiology   Spectra #[phone] 55yo F with PMH of breast CA s/p R mastectomy, radiation, chemo (final treatment May 2019), HTN, and tachycardia presents to the ED for worsening SOB, BALLARD, and BLE edema x1 week.     Cardio consulted for persistent tachycardia, SOB, b/l LE edema. Patient has small pleural effusion on cxr, PE unlikely given negative CTA. Echo today shows mitral valve calcifications, premature valve disease and premature coronary disease, normal LV function, HF less likely, unsure if calcifications due to chemo/ radiation.  Sinus tachycardia likely 2/2 anemia and possible metastatic disease 2/2 multiple liver lesions. LE edema likely 2/2 hypoalbuminemia, dopplers negative for DVT with R calf hematoma.    Tachycardia/SOB  - EKG showed sinus tach, rate 107 with non-specific TWI in lead lll and V1, with poor R wave progression  - Tachycardia is likely reactive to pain, SOB, and anemia  - CTA chest negative of PE but with hepatic lesion and left 10th rib lesion suspicious of metastatic disease  - Rate has been controlled, in NSR.  Can discontinue telemetry  - s/p liver Bx today.  Follow up result  - Resolved.   CTA chest small right pleural effusion.  Comfortable on RA.  - Continue BB  - MOnitor electrolytes.  Replete to keep K>4 and Mag>2    DVT ppx  - Per Primary    - Other cardiovascular workup will depend on clinical course.  - All other workup per primary team.  - Will continue to follow.    Michelle Quintana DNP, NP-C  Cardiology   Spectra #[phone] 55yo F with PMH of breast CA s/p R mastectomy, radiation, chemo (final treatment May 2019), HTN, and tachycardia presents to the ED for worsening SOB, BALLARD, and BLE edema x1 week.     Cardio consulted for persistent tachycardia, SOB, b/l LE edema. Patient has small pleural effusion on cxr, PE unlikely given negative CTA. Echo today shows mitral valve calcifications, premature valve disease and premature coronary disease, normal LV function, HF less likely, unsure if calcifications due to chemo/ radiation.  Sinus tachycardia likely 2/2 anemia and possible metastatic disease 2/2 multiple liver lesions. LE edema likely 2/2 hypoalbuminemia, dopplers negative for DVT with R calf hematoma.    Tachycardia/SOB  - EKG showed sinus tach, rate 107 with non-specific TWI in lead lll and V1, with poor R wave progression  - Tachycardia is likely reactive to pain, SOB, and anemia  - CTA chest negative of PE but with hepatic lesion and left 10th rib lesion suspicious of metastatic disease  - Rate mostly controlled.  Can discontinue telemetry  - s/p liver Bx today.  Follow up result  - Resolved.   CTA chest small right pleural effusion.  Comfortable on RA.  - Continue BB  - MOnitor electrolytes.  Replete to keep K>4 and Mag>2    DVT ppx  - Per Primary    - Other cardiovascular workup will depend on clinical course.  - All other workup per primary team.  - Will continue to follow.    Michelle Quintana DNP, NP-C  Cardiology   Spectra #[phone] 53yo F with PMH of breast CA s/p R mastectomy, radiation, chemo (final treatment May 2019), HTN, and tachycardia presents to the ED for worsening SOB, BALLARD, and BLE edema x1 week.     Cardio consulted for persistent tachycardia, SOB, b/l LE edema. Patient has small pleural effusion on cxr, PE unlikely given negative CTA. Echo today shows mitral valve calcifications, premature valve disease and premature coronary disease, normal LV function, HF less likely, unsure if calcifications due to chemo/ radiation.  Sinus tachycardia likely 2/2 anemia and possible metastatic disease 2/2 multiple liver lesions. LE edema likely 2/2 hypoalbuminemia, dopplers negative for DVT with R calf hematoma.    Tachycardia/SOB  - EKG showed sinus tach, rate 107 with non-specific TWI in lead lll and V1, with poor R wave progression  - Tachycardia is likely reactive to pain, SOB, and anemia  - CTA chest negative of PE but with hepatic lesion and left 10th rib lesion suspicious of metastatic disease  - Rate mostly controlled.  Can discontinue telemetry  - s/p liver Bx today.  Follow up result  - Resolved.   CTA chest small right pleural effusion.  Comfortable on RA.  - Continue BB.  Can increas to Toprol 25 QD  - MOnitor electrolytes.  Replete to keep K>4 and Mag>2    DVT ppx  - Per Primary    - Other cardiovascular workup will depend on clinical course.  - All other workup per primary team.  - Will continue to follow.    Michelle Quintana DNP, NP-C  Cardiology   Spectra #[phone]

## 2020-07-24 NOTE — CHART NOTE - NSCHARTNOTEFT_GEN_A_CORE
Kingsley has liver lesions best seen on MRI. Poorly indemnified on US scan. partially localized on CT scan with contrast. Needs liver biopsy. CT scan guided procedure. Sedation provided by anesthesiologist. An informed consent obtained. Risks and benefits discussed.

## 2020-07-24 NOTE — PROGRESS NOTE ADULT - PROBLEM SELECTOR PLAN 1
Now resolved.  Hx of breast CA s/p mastectomy, radiation, and chemo   -CTA showed possible liver and rib mets.   -Pt reports biopsy in March 2020 at [facility] confirmed scar tissue in the ribs.  -FU TTE.  -Diuresed w/ Lasix  -ABG cancelled.(unable to palpate pulses pt reporting no dyspnea O2 Sat 100% on RA) Now resolved.  Hx of breast CA s/p mastectomy, radiation, and chemo   -CTA showed possible liver and rib mets.   -Pt reports biopsy in March 2020 at [facility] confirmed scar tissue in the ribs.  -TTE benign. EF 70%.  -Diuresed w/ Lasix  -ABG cancelled.(unable to palpate pulses pt reporting no dyspnea O2 Sat 100% on RA) Now resolved.  Hx of breast CA s/p mastectomy, radiation, and chemo   -CTA showed possible liver and rib mets.   -Pt reports biopsy in March 2020 at [facility] confirmed scar tissue in the ribs.  -TTE benign. EF 70% no chf , no pe.  -Diuresed w/ Lasix  -ABG cancelled.(unable to palpate pulses pt reporting no dyspnea O2 Sat 100% on RA)

## 2020-07-24 NOTE — PROGRESS NOTE ADULT - PROBLEM SELECTOR PLAN 2
Hx of breast CA s/p mastectomy, radiation, and chemo in 2019  -patient followed by [facility]/onc, Dr. Ramos  -Concern for metastatic dz to liver. LFTs elevated.   -MRA abdomen: multiple lesions in the liver, possible common bile duct compression, ascites and mild pleural effusion.   -Dr. Jesus ([facility]); Positive antigens CEA, Ca 15.3, Ca 27.29, Plan for liver biopsy with IR 7/24.  -NPO, Hold lovenox.

## 2020-07-24 NOTE — PROGRESS NOTE ADULT - SUBJECTIVE AND OBJECTIVE BOX
[facility] -- Rani Devlin, Wilmer Nobles, Uriel Norton [place], [place]  Office # [phone]    Follow Up: SOB, Tachycardia     Subjective/Observations: Awake and alert, s/p liver Bx by IR.  Comfortable on RA.  Denies any respiratory or cardiac discomfort.  Denies N/V, abdominal pair.  No post procedure discomfort.  However, c/o mild lighthadedness.. No tele events    REVIEW OF SYSTEMS: All other review of systems is negative unless indicated above  PAST MEDICAL & SURGICAL HISTORY:  Breast cancer: s/p R mastectomy, radiation, and chemotherapy  Hypertension  HTN (hypertension)  H/O mastectomy, right: 2019  H/O mastectomy, right    MEDICATIONS  (STANDING):  calcitonin Injectable 270 International Unit(s) IntraMuscular every 12 hours  metoprolol succinate ER 25 milliGRAM(s) Oral daily  sodium chloride 0.9%. 1000 milliLiter(s) (100 mL/Hr) IV Continuous <Continuous>    MEDICATIONS  (PRN):  ondansetron    Tablet 4 milliGRAM(s) Oral three times a day PRN Nausea  oxycodone    5 mG/acetaminophen 325 mG 1 Tablet(s) Oral every 6 hours PRN Moderate Pain (4 - 6)    Allergies    amoxicillin (Hives)    Intolerances    Vital Signs Last 24 Hrs  T(C): 36.4 (24 Jul 2020 15:25), Max: 37.1 (23 Jul 2020 17:31)  T(F): 97.6 (24 Jul 2020 15:25), Max: 98.8 (23 Jul 2020 17:31)  HR: 100 (24 Jul 2020 15:25) (98 - 110)  BP: 119/81 (24 Jul 2020 15:25) (104/68 - 131/77)  BP(mean): --  RR: 17 (24 Jul 2020 15:25) (17 - 18)  SpO2: 97% (24 Jul 2020 15:25) (96% - 100%)  I&O's Summary    Weight (kg): 67.6 (07-24 @ 05:14)    PHYSICAL EXAM:  TELE: NSR  Constitutional: NAD, awake and alert, well-developed  HEENT: Moist Mucous Membranes, Anicteric  Pulmonary: Non-labored, breath sounds are clear bilaterally, No wheezing, rales or rhonchi  Cardiovascular: Regular, S1 and S2, No murmurs, rubs, gallops or clicks  Gastrointestinal: Bowel Sounds present, soft, nontender.   Lymph: No peripheral edema. No lymphadenopathy.  Skin: No visible rashes or ulcers.  RUQ dressing dry and intact  Psych:  Mood & affect appropriate  LABS: All Labs Reviewed:                        8.3    14.87 )-----------( 242      ( 24 Jul 2020 09:29 )             26.6                         8.6    15.59 )-----------( 292      ( 23 Jul 2020 09:17 )             27.1                         7.5    13.52 )-----------( 258      ( 22 Jul 2020 07:12 )             23.5     24 Jul 2020 09:29    134    |  101    |  12     ----------------------------<  90     4.1     |  26     |  0.44   23 Jul 2020 09:17    136    |  103    |  13     ----------------------------<  105    3.8     |  25     |  0.51   22 Jul 2020 07:12    133    |  99     |  15     ----------------------------<  131    3.6     |  26     |  0.62     Ca    11.6       24 Jul 2020 09:29  Ca    11.7       23 Jul 2020 09:17  Ca    10.5       22 Jul 2020 07:12  Phos  2.4       22 Jul 2020 07:12  Mg     1.9       22 Jul 2020 07:12    TPro  5.7    /  Alb  1.8    /  TBili  1.0    /  DBili  x      /  AST  95     /  ALT  56     /  AlkPhos  550    24 Jul 2020 09:29  TPro  6.2    /  Alb  2.0    /  TBili  1.1    /  DBili  x      /  AST  79     /  ALT  61     /  AlkPhos  570    23 Jul 2020 09:17  TPro  5.5    /  Alb  1.7    /  TBili  1.1    /  DBili  x      /  AST  85     /  ALT  60     /  AlkPhos  535    22 Jul 2020 07:12    PT/INR - ( 23 Jul 2020 12:07 )   PT: 16.0 sec;   INR: 1.39 ratio      PTT - ( 23 Jul 2020 12:07 )  PTT:26.5 sec      < from: TTE Echo Complete w/o Contrast w/ Doppler (07.23.20 @ 10:13) >     EXAM:  ECHO TTE WO CON COMP W DOPP         PROCEDURE DATE:  07/23/2020        INTERPRETATION:  INDICATION: Dyspnea  Sonographer PH    Blood Pressure 108/74    Height 160 cm     Weight 99 kg       BSA 2.0 sq m    Dimensions:    LA 3.0       Normal Values: 2.0 - 4.0 cm    Ao 3.1        Normal Values: 2.0 - 3.8 cm  SEPTUM 0.9       Normal Values: 0.6 - 1.2 cm  PWT 0.8       Normal Values: 0.6 - 1.1 cm  LVIDd 4.0         Normal Values: 3.0 - 5.6 cm  LVIDs 2.5         Normal Values: 1.8 - 4.0 cm      OBSERVATIONS:  Technically difficult study, patient tachycardic  Mitral Valve: normal, trace physiologic MR. Mitral annular calcification  Aortic Valve/Aorta: normal trileaflet aortic valve.  Tricuspid Valve: Mild TR.  Pulmonic Valve: Not well-visualized  Left Atrium: normal  Right Atrium: Not well-visualized  Left Ventricle: normal LV size and systolic function, estimated LVEF of 70 %.  Right Ventricle: Grossly normal size and systolic function.  Pericardium/Pleura: normal, no significant pericardial effusion.  Pulmonary/RV Pressure: estimated PA systolic pressure of 33 mmHg     Conclusion:   Technically difficult study, patient tachycardic  Normal left ventricular internal dimensions and systolic function, estimated LVEF of 70 %.   Grossly normal RV size and systolic function.    Normal trileaflet aortic valve, without AI.   Trace physiologic MR and TR.    No significant pericardial effusion.      MICHELLE ESTEVEZ   This document has been electronically signed. Jul 23 2020  5:48PM     < end of copied text >    < from: CT Angio Chest w/ IV Cont (07.21.20 @ 21:22) >    EXAM:  CT ANGIO CHEST (W)AW IC                          PROCEDURE DATE:  07/21/2020      INTERPRETATION:  EXAMINATION:  CTA CHEST WITH IV CONTRAST, CT PULMONARY ANGIOGRAM    DATE OF EXAM: 7/21/2020 9:22 PM    HISTORY: Shortness of breath, chest pain    TECHNIQUE: CTA examination of the chest was performed following the intravenous administration of 71 mL Omnipaque 350.  29 mL Omnipaque 350 discarded. Sagittal, coronal and 3-D reformatted images were provided. CT dose lowering techniques were used, to include: automated exposure control, adjustment for patient size, and or use of iterative reconstruction.    COMPARISON: None.    FINDINGS:    Lungs: 5 mm groundglass nodule in the right upper lobe on axial series 5, image 54. Central airways are clear.    Pleura: Small right pleural effusion. No pneumothorax.    Mediastinum and Karen: No adenopathy or hemorrhage.    Pulmonary Arteries: Well opacified to the distal segmental level. No acute thromboembolus. Main pulmonary artery is normal in caliber.    Cardiovascular: Heart size is normal. Thoracic aorta normal in caliber without dissection.     Upper Abdomen: A numerable hypodense nodules in the severely enlarged liver..    Chest Wall: Right-sided mastectomy.    Musculoskeletal: Lytic lesion in the posterior lateral left 10th rib. This is best seen on coronal series 7, image 52 and sagittal series 8, image 24. This lesion extends to both the anterior and posterior cortex..    IMPRESSION:    1.  No acute pulmonary embolus.  2.  Appearance of the liver is highly suspicious for metastatic disease.  3.  Small right pleural effusion.  4.  Lytic lesion in the left 10th rib, also suspicious for metastasis.  5.  5 mm groundglass micronodules in the right upper lobe, indeterminate.    SHAUN STRONG M.D., ATTENDING RADIOLOGIST  This document has been electronically signed. Jul 21 2020 10:06PM      < end of copied text >    < from: 12 Lead ECG (07.24.20 @ 08:41) >    Ventricular Rate 107 BPM    Atrial Rate 107 BPM    P-R Interval 138 ms    QRS Duration 82 ms    Q-T Interval 324 ms    QTC Calculation(Bezet) 432 ms    P Axis 47 degrees    R Axis -7 degrees    T Axis 4 degrees    Diagnosis Line Sinus tachycardia  Otherwise normal ECG  When compared with ECG of 22-JUL-2020 00:02,  Nonspecific T wave abnormality now evident in Anterior leads  Confirmed by ERIN NORTON ([id number]) on 7/24/2020 12:31:03 PM    < end of copied text >

## 2020-07-24 NOTE — PROGRESS NOTE ADULT - PROBLEM SELECTOR PLAN 6
VTE ppx: Lovenox 40 QHS
VTE ppx: Lovenox on hold for IR biopsy of liver 7/24.
VTE ppx: Lovenox on hold for IR biopsy of liver 7/24.

## 2020-07-24 NOTE — PROGRESS NOTE ADULT - SUBJECTIVE AND OBJECTIVE BOX
[INTERVAL HX: ]  Patient seen and examined;  Chart reviewed and events noted;   no CP  no SOB  to go for IR bx today.  Discussed imaging.       Patient is a 54y Female with a known history of :  Pleural effusion, not elsewhere classified (J90) [Active]  Breast cancer (C50.919) [Active]  Pleural effusion (J90) [Inactive]  Hypertension (I10) [Active]  HTN (hypertension) (I10) [Active]  H/O mastectomy, right (Z90.11) [Active]  H/O mastectomy, right (Z90.11) [Active]    HPI:  53yo F with PMH of breast CA s/p R mastectomy, radiation, chemo (final treatment May 2019), HTN, and tachycardia presents to the ED for worsening SOB, BALLARD, and BLE edema x1 week. Patient states that for the past 2mo, she has been experiencing mild dyspnea on exertion. Over the past week, she notes significant worsening of her sx. Patient has no prior hx of CHF. Patient was transferred from [place] ED. RR was 21 and HR was 128. Patient states that she has been tachycardic at baseline since her mastectomy last year. She was found to have a WBC count of 19.3, proBNP of 355, alk phos 539, and AST/ALT of 93 and 68. CTA was performed and showed R pleural effusion, 5mm ground glass nodules in RUL, and suspicious liver and rib mets. Patient was given Lasix 40mg and 1L NS in the ED.     In the ED:  Vitals:  and RR 20. All other vitals stable  CBC: WBC count 14.5, H&H 7.5/23.3  Chem: Albumin 1.6, Alk phos 441, AST 73, ALT 55 (22 Jul 2020 03:01)    MEDICATIONS  (STANDING):  calcitonin Injectable 270 International Unit(s) IntraMuscular every 12 hours  metoprolol succinate ER 25 milliGRAM(s) Oral daily  sodium chloride 0.9%. 1000 milliLiter(s) (100 mL/Hr) IV Continuous <Continuous>    MEDICATIONS  (PRN):  ondansetron    Tablet 4 milliGRAM(s) Oral three times a day PRN Nausea  oxycodone    5 mG/acetaminophen 325 mG 1 Tablet(s) Oral every 6 hours PRN Moderate Pain (4 - 6)      Vital Signs Last 24 Hrs  T(C): 36.4 (24 Jul 2020 15:25), Max: 36.9 (24 Jul 2020 11:05)  T(F): 97.6 (24 Jul 2020 15:25), Max: 98.4 (24 Jul 2020 11:05)  HR: 100 (24 Jul 2020 15:25) (98 - 110)  BP: 119/81 (24 Jul 2020 15:25) (104/68 - 131/77)  BP(mean): --  RR: 17 (24 Jul 2020 15:25) (17 - 18)  SpO2: 97% (24 Jul 2020 15:25) (97% - 100%)      [PHYSICAL EXAM]  General: adult in NAD,  WN,  WD.  HEENT: clear oropharynx, anicteric sclera, pink conjunctivae.  Neck: supple, no masses.  CV: normal S1S2, no murmur, no rubs, no gallops.  Lungs: clear to auscultation, no wheezes, no rales, no rhonchi.  Abdomen: soft, non-tender, non-distended, no hepatosplenomegaly, normal BS, no guarding.  Ext: no clubbing, no cyanosis, no edema.  Skin: no rashes,  no petechiae, no venous stasis changes.  Neuro: alert and oriented X 3 , no focal motor deficits.  LN: no SC ARNOL.      [LABS:]                        8.3    14.87 )-----------( 242      ( 24 Jul 2020 09:29 )             26.6     07-24    134<L>  |  101  |  12  ----------------------------<  90  4.1   |  26  |  0.44<L>    Ca    11.6<H>      24 Jul 2020 09:29  Phos  2.9     07-24    TPro  5.7<L>  /  Alb  1.8<L>  /  TBili  1.0  /  DBili  x   /  AST  95<H>  /  ALT  56  /  AlkPhos  550<H>  07-24    PT/INR - ( 23 Jul 2020 12:07 )   PT: 16.0 sec;   INR: 1.39 ratio    PTT - ( 23 Jul 2020 12:07 )  PTT:26.5 sec      Ferritin, Serum (07.22.20 @ 19:35)    Ferritin, Serum: 7272: Test Repeated ng/mL      Reticulocyte Count (07.22.20 @ 14:17)    RBC Count: 2.70 M/uL    Reticulocyte Percent: 4.4 %    Absolute Reticulocytes: 119.9 K/uL    C-Reactive Protein, Serum (07.22.20 @ 19:38)    C-Reactive Protein, Serum: 17.43 mg/dL    Sedimentation Rate, Erythrocyte (07.22.20 @ 14:17)    Sedimentation Rate, Erythrocyte: 105 mm/hr        Cancer Antigen, Breast Ca 15.3 (07.22.20 @ 19:35)    Cancer Antigen, Breast Ca 15.3:  55.6 U/mL    Cancer Antigen, Ca 27.29 (07.22.20 @ 19:35)    Cancer Antigen, Ca 27.29: 191.5 U/mL    Carcinoembryonic Antigen (07.22.20 @ 19:35)    Carcinoembryonic Antigen: 6607.0 ng/mL        Hepatitis C Antibody Test (07.23.20 @ 11:13)    Hepatitis C Virus S/CO Ratio: 0.09 S/CO    Hepatitis C Virus Interpretation: Nonreact    Hepatitis B Core Antibody, Total (07.22.20 @ 19:31)    Hepatitis B Core Antibody, Total: Nonreact    Hepatitis B Surface Antibody (07.22.20 @ 19:31)    Hepatitis B Surface Antibody: Nonreact    Hepatitis B Surface Antigen (07.22.20 @ 19:31)    Hepatitis B Surface Antigen: Nonreact    Alpha Fetoprotein - Tumor Marker (07.22.20 @ 19:35)    Alpha Fetoprotein - Tumor Marker: 3.0 ng/mL          [RADIOLOGY STUDIES:]    < from: MR Abdomen w/wo IV Cont (07.22.20 @ 14:56) >  EXAM:  MR ABDOMEN WAW IC                        PROCEDURE DATE:  07/22/2020    INTERPRETATION:  Abdominal MR without and with IV contrast including MRCP  Indication: History of breast cancer  Technique: Utilizing a 1.5 Christina high-field magnet, multiplanar multisequence MR images of the abdomen are acquired without and with IV contrast ( 7cc Gadavist administered, 3 cc discarded), supplemented by thin and thick slab MRCP images. Postcontrast images are acquired dynamically.  Comparison: None.  Findings:   ·	Innumerable mildly T2 hyperintense ring-enhancing lesions are seen throughout the liver with the largest measuring up to 9.0 cm in the right hepatic lobe, suspicious for metastasis. The gallbladder is not visualized, probably due to contraction or prior cholecystectomy. The proximal common bile duct is not visualized, probably due to compression by a 1.2 cm mildly enlarged periportal lymph node at the roman hepatis. No evidence for intrahepatic biliary ductal dilatation.  ·	The pancreas, spleen, adrenals and kidneys appear unremarkable.  ·	Small right ascites. The visualized bowel structures appear grossly unremarkable.  ·	Limited imaging through the lower chest demonstrate a mild right pleural effusion.  Impression:   ·	Multiple lesions in the liver as described above, suspicious for metastasis.  ·	The proximal common bile duct is not visualized, probably due to compression by a 1.2 cm mildly enlarged periportal lymph node at the roman hepatis. No evidence for intrahepatic biliary ductal dilatation.  ·	Small ascites.  ·	Mild right pleural effusion.  < end of copied text >

## 2020-07-25 ENCOUNTER — TRANSCRIPTION ENCOUNTER (OUTPATIENT)
Age: 55
End: 2020-07-25

## 2020-07-25 VITALS
HEART RATE: 98 BPM | TEMPERATURE: 97 F | OXYGEN SATURATION: 98 % | SYSTOLIC BLOOD PRESSURE: 125 MMHG | RESPIRATION RATE: 17 BRPM | DIASTOLIC BLOOD PRESSURE: 87 MMHG

## 2020-07-25 LAB
ALBUMIN SERPL ELPH-MCNC: 1.6 G/DL — LOW (ref 3.3–5)
ALP SERPL-CCNC: 396 U/L — HIGH (ref 40–120)
ALT FLD-CCNC: 43 U/L — SIGNIFICANT CHANGE UP (ref 12–78)
ANION GAP SERPL CALC-SCNC: 7 MMOL/L — SIGNIFICANT CHANGE UP (ref 5–17)
AST SERPL-CCNC: 76 U/L — HIGH (ref 15–37)
BILIRUB SERPL-MCNC: 0.6 MG/DL — SIGNIFICANT CHANGE UP (ref 0.2–1.2)
BUN SERPL-MCNC: 8 MG/DL — SIGNIFICANT CHANGE UP (ref 7–23)
CALCIUM SERPL-MCNC: 9.5 MG/DL — SIGNIFICANT CHANGE UP (ref 8.5–10.1)
CHLORIDE SERPL-SCNC: 98 MMOL/L — SIGNIFICANT CHANGE UP (ref 96–108)
CO2 SERPL-SCNC: 25 MMOL/L — SIGNIFICANT CHANGE UP (ref 22–31)
CREAT SERPL-MCNC: 0.42 MG/DL — LOW (ref 0.5–1.3)
FOLATE SERPL-MCNC: 4.8 NG/ML — SIGNIFICANT CHANGE UP
GLUCOSE SERPL-MCNC: 155 MG/DL — HIGH (ref 70–99)
HCT VFR BLD CALC: 22 % — LOW (ref 34.5–45)
HGB BLD-MCNC: 8 G/DL — LOW (ref 11.5–15.5)
MCHC RBC-ENTMCNC: 31.6 PG — SIGNIFICANT CHANGE UP (ref 27–34)
MCHC RBC-ENTMCNC: 36.4 GM/DL — HIGH (ref 32–36)
MCV RBC AUTO: 87 FL — SIGNIFICANT CHANGE UP (ref 80–100)
NRBC # BLD: 0 /100 WBCS — SIGNIFICANT CHANGE UP (ref 0–0)
PLATELET # BLD AUTO: 202 K/UL — SIGNIFICANT CHANGE UP (ref 150–400)
POTASSIUM SERPL-MCNC: 3.7 MMOL/L — SIGNIFICANT CHANGE UP (ref 3.5–5.3)
POTASSIUM SERPL-SCNC: 3.7 MMOL/L — SIGNIFICANT CHANGE UP (ref 3.5–5.3)
PROT SERPL-MCNC: 4.8 G/DL — LOW (ref 6–8.3)
RBC # BLD: 2.53 M/UL — LOW (ref 3.8–5.2)
RBC # FLD: 17.9 % — HIGH (ref 10.3–14.5)
SODIUM SERPL-SCNC: 130 MMOL/L — LOW (ref 135–145)
VIT B12 SERPL-MCNC: >2000 PG/ML — HIGH (ref 232–1245)
WBC # BLD: 13.73 K/UL — HIGH (ref 3.8–10.5)
WBC # FLD AUTO: 13.73 K/UL — HIGH (ref 3.8–10.5)

## 2020-07-25 PROCEDURE — 74183 MRI ABD W/O CNTR FLWD CNTR: CPT

## 2020-07-25 PROCEDURE — 83970 ASSAY OF PARATHORMONE: CPT

## 2020-07-25 PROCEDURE — 85652 RBC SED RATE AUTOMATED: CPT

## 2020-07-25 PROCEDURE — 81025 URINE PREGNANCY TEST: CPT

## 2020-07-25 PROCEDURE — 93005 ELECTROCARDIOGRAM TRACING: CPT

## 2020-07-25 PROCEDURE — 88341 IMHCHEM/IMCYTCHM EA ADD ANTB: CPT

## 2020-07-25 PROCEDURE — 77012 CT SCAN FOR NEEDLE BIOPSY: CPT

## 2020-07-25 PROCEDURE — 82746 ASSAY OF FOLIC ACID SERUM: CPT

## 2020-07-25 PROCEDURE — 86803 HEPATITIS C AB TEST: CPT

## 2020-07-25 PROCEDURE — 85730 THROMBOPLASTIN TIME PARTIAL: CPT

## 2020-07-25 PROCEDURE — 99232 SBSQ HOSP IP/OBS MODERATE 35: CPT

## 2020-07-25 PROCEDURE — 93306 TTE W/DOPPLER COMPLETE: CPT

## 2020-07-25 PROCEDURE — 85045 AUTOMATED RETICULOCYTE COUNT: CPT

## 2020-07-25 PROCEDURE — 86140 C-REACTIVE PROTEIN: CPT

## 2020-07-25 PROCEDURE — 82607 VITAMIN B-12: CPT

## 2020-07-25 PROCEDURE — 86704 HEP B CORE ANTIBODY TOTAL: CPT

## 2020-07-25 PROCEDURE — 83519 RIA NONANTIBODY: CPT

## 2020-07-25 PROCEDURE — A9579: CPT

## 2020-07-25 PROCEDURE — 88360 TUMOR IMMUNOHISTOCHEM/MANUAL: CPT

## 2020-07-25 PROCEDURE — 87635 SARS-COV-2 COVID-19 AMP PRB: CPT

## 2020-07-25 PROCEDURE — 85610 PROTHROMBIN TIME: CPT

## 2020-07-25 PROCEDURE — 88333 PATH CONSLTJ SURG CYTO XM 1: CPT

## 2020-07-25 PROCEDURE — 83605 ASSAY OF LACTIC ACID: CPT

## 2020-07-25 PROCEDURE — 83550 IRON BINDING TEST: CPT

## 2020-07-25 PROCEDURE — 88334 PATH CONSLTJ SURG CYTO XM EA: CPT

## 2020-07-25 PROCEDURE — 87340 HEPATITIS B SURFACE AG IA: CPT

## 2020-07-25 PROCEDURE — 84100 ASSAY OF PHOSPHORUS: CPT

## 2020-07-25 PROCEDURE — 83540 ASSAY OF IRON: CPT

## 2020-07-25 PROCEDURE — 88307 TISSUE EXAM BY PATHOLOGIST: CPT

## 2020-07-25 PROCEDURE — 86300 IMMUNOASSAY TUMOR CA 15-3: CPT

## 2020-07-25 PROCEDURE — 83735 ASSAY OF MAGNESIUM: CPT

## 2020-07-25 PROCEDURE — 80053 COMPREHEN METABOLIC PANEL: CPT

## 2020-07-25 PROCEDURE — 88342 IMHCHEM/IMCYTCHM 1ST ANTB: CPT

## 2020-07-25 PROCEDURE — 36415 COLL VENOUS BLD VENIPUNCTURE: CPT

## 2020-07-25 PROCEDURE — 82728 ASSAY OF FERRITIN: CPT

## 2020-07-25 PROCEDURE — 82105 ALPHA-FETOPROTEIN SERUM: CPT

## 2020-07-25 PROCEDURE — 85027 COMPLETE CBC AUTOMATED: CPT

## 2020-07-25 PROCEDURE — 82272 OCCULT BLD FECES 1-3 TESTS: CPT

## 2020-07-25 PROCEDURE — 76705 ECHO EXAM OF ABDOMEN: CPT

## 2020-07-25 PROCEDURE — 99285 EMERGENCY DEPT VISIT HI MDM: CPT

## 2020-07-25 PROCEDURE — 99239 HOSP IP/OBS DSCHRG MGMT >30: CPT | Mod: GC

## 2020-07-25 PROCEDURE — 93970 EXTREMITY STUDY: CPT

## 2020-07-25 PROCEDURE — 82310 ASSAY OF CALCIUM: CPT

## 2020-07-25 PROCEDURE — 47000 NEEDLE BIOPSY OF LIVER PERQ: CPT

## 2020-07-25 PROCEDURE — 99053 MED SERV 10PM-8AM 24 HR FAC: CPT

## 2020-07-25 PROCEDURE — 86706 HEP B SURFACE ANTIBODY: CPT

## 2020-07-25 PROCEDURE — 82378 CARCINOEMBRYONIC ANTIGEN: CPT

## 2020-07-25 PROCEDURE — 86769 SARS-COV-2 COVID-19 ANTIBODY: CPT

## 2020-07-25 RX ORDER — METOPROLOL TARTRATE 50 MG
1 TABLET ORAL
Qty: 30 | Refills: 0
Start: 2020-07-25 | End: 2020-08-23

## 2020-07-25 RX ORDER — PAMIDRONATE DISODIUM 9 MG/ML
60 INJECTION, SOLUTION INTRAVENOUS ONCE
Refills: 0 | Status: DISCONTINUED | OUTPATIENT
Start: 2020-07-25 | End: 2020-07-25

## 2020-07-25 RX ORDER — ONDANSETRON 8 MG/1
1 TABLET, FILM COATED ORAL
Qty: 15 | Refills: 0
Start: 2020-07-25 | End: 2020-07-29

## 2020-07-25 RX ORDER — SODIUM CHLORIDE 9 MG/ML
1000 INJECTION INTRAMUSCULAR; INTRAVENOUS; SUBCUTANEOUS
Refills: 0 | Status: DISCONTINUED | OUTPATIENT
Start: 2020-07-25 | End: 2020-07-25

## 2020-07-25 RX ADMIN — SODIUM CHLORIDE 100 MILLILITER(S): 9 INJECTION INTRAMUSCULAR; INTRAVENOUS; SUBCUTANEOUS at 09:55

## 2020-07-25 RX ADMIN — Medication 25 MILLIGRAM(S): at 05:37

## 2020-07-25 RX ADMIN — OXYCODONE AND ACETAMINOPHEN 1 TABLET(S): 5; 325 TABLET ORAL at 03:52

## 2020-07-25 RX ADMIN — ONDANSETRON 4 MILLIGRAM(S): 8 TABLET, FILM COATED ORAL at 10:39

## 2020-07-25 RX ADMIN — CALCITONIN SALMON 270 INTERNATIONAL UNIT(S): 200 INJECTION, SOLUTION INTRAMUSCULAR at 06:27

## 2020-07-25 RX ADMIN — OXYCODONE AND ACETAMINOPHEN 1 TABLET(S): 5; 325 TABLET ORAL at 04:37

## 2020-07-25 RX ADMIN — ENOXAPARIN SODIUM 40 MILLIGRAM(S): 100 INJECTION SUBCUTANEOUS at 13:36

## 2020-07-25 RX ADMIN — SODIUM CHLORIDE 100 MILLILITER(S): 9 INJECTION, SOLUTION INTRAVENOUS at 05:36

## 2020-07-25 NOTE — PROGRESS NOTE ADULT - ASSESSMENT
55yo F with PMH of breast CA s/p R mastectomy, radiation, chemo (final treatment May 2019), HTN, and tachycardia presents to the ED for worsening SOB, BALLARD, and BLE edema x1 week.     Cardio consulted for persistent tachycardia, SOB, b/l LE edema. Patient has small pleural effusion on cxr, PE unlikely given negative CTA. Echo shows mitral valve calcifications, premature valve disease and premature coronary disease, normal LV function, HF less likely, unsure if calcifications due to chemo/ radiation.  Sinus tachycardia likely 2/2 anemia and possible metastatic disease 2/2 multiple liver lesions. LE edema likely 2/2 hypoalbuminemia, dopplers negative for DVT with R calf hematoma.    Tachycardia/SOB  - EKG showed sinus tach, rate 107 with non-specific TWI in lead lll and V1, with poor R wave progression  - Tachycardia is likely reactive to pain, SOB, and anemia  - CTA chest negative of PE but with hepatic lesion and left 10th rib lesion suspicious of metastatic disease  -- Monitor electrolytes.  Replete to keep K>4 and Mag>2  -continue with toprol 25mg po daily , hx htn stable 117/73    hem onc  -hx breast cancer now with concern for metastatic liver/ lung mets- follow up hem on recs, , sp ct guided biopsy   -anemia likely chronic disease- transfuse as per hem onc     DVT ppx  - Per Primary    - Other cardiovascular workup will depend on clinical course.  - All other workup per primary team.  - Will continue to follow.  Mignon Ruiz FNP-C  Cardiology NP  [phone]

## 2020-07-25 NOTE — DISCHARGE NOTE NURSING/CASE MANAGEMENT/SOCIAL WORK - PATIENT PORTAL LINK FT
You can access the FollowMyHealth Patient Portal offered by [facility] by registering at the following website: [email or web address]. By joining [employer]’s FollowMyHealth portal, you will also be able to view your health information using other applications (apps) compatible with our system.

## 2020-07-25 NOTE — PROGRESS NOTE ADULT - REASON FOR ADMISSION
SOB and BLE edema

## 2020-07-25 NOTE — PROGRESS NOTE ADULT - SUBJECTIVE AND OBJECTIVE BOX
[facility] -- Rani Devlin, Giuliano, Wilmer, Uriel Pepe Savella  Office # [phone]      Follow Up:    htn    Subjective/Observations:   + dizziness after taking pain medications.  No complaints of chest pain, dyspnea, or palpitations reported. No signs of orthopnea or PND.      REVIEW OF SYSTEMS: All other review of systems is negative unless indicated above    PAST MEDICAL & SURGICAL HISTORY:  Breast cancer: s/p R mastectomy, radiation, and chemotherapy  Hypertension  HTN (hypertension)  H/O mastectomy, right: 2019  H/O mastectomy, right      MEDICATIONS  (STANDING):  calcitonin Injectable 270 International Unit(s) IntraMuscular every 12 hours  dextrose 5%. 2000 milliLiter(s) (100 mL/Hr) IV Continuous <Continuous>  enoxaparin Injectable 40 milliGRAM(s) SubCutaneous daily  metoprolol succinate ER 25 milliGRAM(s) Oral daily    MEDICATIONS  (PRN):  ondansetron    Tablet 4 milliGRAM(s) Oral three times a day PRN Nausea  oxycodone    5 mG/acetaminophen 325 mG 1 Tablet(s) Oral every 6 hours PRN Moderate Pain (4 - 6)      Allergies    amoxicillin (Hives)    Intolerances        Vital Signs Last 24 Hrs  T(C): 37.2 (25 Jul 2020 05:18), Max: 37.2 (25 Jul 2020 05:18)  T(F): 99 (25 Jul 2020 05:18), Max: 99 (25 Jul 2020 05:18)  HR: 100 (25 Jul 2020 05:18) (100 - 110)  BP: 117/73 (25 Jul 2020 05:18) (117/73 - 131/77)  BP(mean): --  RR: 17 (25 Jul 2020 05:18) (17 - 17)  SpO2: 96% (25 Jul 2020 05:18) (96% - 97%)    I&O's Summary    24 Jul 2020 07:01  -  25 Jul 2020 07:00  --------------------------------------------------------  IN: 1400 mL / OUT: 0 mL / NET: 1400 mL          PHYSICAL EXAM:  TELE: not on tele   Constitutional: NAD, awake and alert, well-developed  HEENT: Moist Mucous Membranes, Anicteric  Pulmonary: Non-labored, breath sounds are clear bilaterally, No wheezing, crackles or rhonchi  Cardiovascular: Regular, S1 and S2 nl, No murmurs, rubs, gallops or clicks  Gastrointestinal: Bowel Sounds present, soft, nontender.   Lymph: No lymphadenopathy. No peripheral edema.  Skin: No visible rashes or ulcers.  Psych:  Mood & affect appropriate    LABS: All Labs Reviewed:                        8.0    13.73 )-----------( 202      ( 25 Jul 2020 07:08 )             22.0                         8.3    14.87 )-----------( 242      ( 24 Jul 2020 09:29 )             26.6                         8.6    15.59 )-----------( 292      ( 23 Jul 2020 09:17 )             27.1     25 Jul 2020 07:08    130    |  98     |  8      ----------------------------<  155    3.7     |  25     |  0.42   24 Jul 2020 09:29    134    |  101    |  12     ----------------------------<  90     4.1     |  26     |  0.44   23 Jul 2020 09:17    136    |  103    |  13     ----------------------------<  105    3.8     |  25     |  0.51     Ca    9.5        25 Jul 2020 07:08  Ca    11.6       24 Jul 2020 09:29  Ca    11.7       23 Jul 2020 09:17  Phos  2.9       24 Jul 2020 16:51    TPro  4.8    /  Alb  1.6    /  TBili  0.6    /  DBili  x      /  AST  76     /  ALT  43     /  AlkPhos  396    25 Jul 2020 07:08  TPro  5.7    /  Alb  1.8    /  TBili  1.0    /  DBili  x      /  AST  95     /  ALT  56     /  AlkPhos  550    24 Jul 2020 09:29  TPro  6.2    /  Alb  2.0    /  TBili  1.1    /  DBili  x      /  AST  79     /  ALT  61     /  AlkPhos  570    23 Jul 2020 09:17    PT/INR - ( 23 Jul 2020 12:07 )   PT: 16.0 sec;   INR: 1.39 ratio         PTT - ( 23 Jul 2020 12:07 )  PTT:26.5 sec         ECG:  < from: 12 Lead ECG (07.24.20 @ 08:41) >    Ventricular Rate 107 BPM    Atrial Rate 107 BPM    P-R Interval 138 ms    QRS Duration 82 ms    Q-T Interval 324 ms    QTC Calculation(Bezet) 432 ms    P Axis 47 degrees    R Axis -7 degrees    T Axis 4 degrees    Diagnosis Line Sinus tachycardia  Otherwise normal ECG  When compared with ECG of 22-JUL-2020 00:02,  Nonspecific T wave abnormality now evident in Anterior leads      Echo:  < from: TTE Echo Complete w/o Contrast w/ Doppler (07.23.20 @ 10:13) >  OBSERVATIONS:  Technically difficult study, patient tachycardic  Mitral Valve: normal, trace physiologic MR. Mitral annular calcification  Aortic Valve/Aorta: normal trileaflet aortic valve.  Tricuspid Valve: Mild TR.  Pulmonic Valve: Not well-visualized  Left Atrium: normal  Right Atrium: Not well-visualized  Left Ventricle: normal LV size and systolic function, estimated LVEF of 70 %.  Right Ventricle: Grossly normal size and systolic function.  Pericardium/Pleura: normal, no significant pericardial effusion.  Pulmonary/RV Pressure: estimated PA systolic pressure of 33 mmHg     Conclusion:   Technically difficult study, patient tachycardic  Normal left ventricular internal dimensions and systolic function, estimated LVEF of 70 %.   Grossly normal RV size and systolic function.    Normal trileaflet aortic valve, without AI.   Trace physiologic MR and TR.    No significant pericardial effusion.            < end of copied text >      Radiology:  < from: Xray Chest 1 View AP/PA (07.21.20 @ 20:52) >  FINDINGS:    Lungs: There are no lung consolidations. Small right pleural effusion.  Heart: The heart is normal in size.  Mediastinum: The mediastinum is within normal limits.    IMPRESSION:    No lung consolidations.  Small right pleural effusion.      < end of copied text >

## 2020-07-25 NOTE — PROGRESS NOTE ADULT - SUBJECTIVE AND OBJECTIVE BOX
Patient seen and examined;  Chart reviewed and events noted;   Had some nausea today after taking percocet on empty stomach    MEDICATIONS  (STANDING):  enoxaparin Injectable 40 milliGRAM(s) SubCutaneous daily  metoprolol succinate ER 25 milliGRAM(s) Oral daily  sodium chloride 0.9%. 1000 milliLiter(s) (100 mL/Hr) IV Continuous <Continuous>    MEDICATIONS  (PRN):  ondansetron    Tablet 4 milliGRAM(s) Oral three times a day PRN Nausea  oxycodone    5 mG/acetaminophen 325 mG 1 Tablet(s) Oral every 6 hours PRN Moderate Pain (4 - 6)      Vital Signs Last 24 Hrs  T(C): 36.3 (25 Jul 2020 12:36), Max: 37.2 (25 Jul 2020 05:18)  T(F): 97.4 (25 Jul 2020 12:36), Max: 99 (25 Jul 2020 05:18)  HR: 98 (25 Jul 2020 12:36) (98 - 107)  BP: 125/87 (25 Jul 2020 12:36) (117/73 - 125/87)  RR: 17 (25 Jul 2020 12:36) (17 - 17)  SpO2: 98% (25 Jul 2020 12:36) (96% - 98%)    PHYSICAL EXAM  General: adult in NAD  HEENT: clear oropharynx, anicteric sclera, pink conjunctivae  Neck: supple  CV: normal S1S2 with no murmur rubs or gallops  Lungs: clear to auscultation, no wheezes, no rhales  Abdomen: soft non-tender non-distended, no hepato/splenomegaly  Ext: no clubbing cyanosis or edema  Skin: no rashes and no petichiae  Neuro: alert and oriented X3 no focal deficits      LABS:                        8.0    13.73 )-----------( 202      ( 25 Jul 2020 07:08 )             22.0     Hemoglobin: 8.0 g/dL (07-25 @ 07:08)  Hemoglobin: 8.3 g/dL (07-24 @ 09:29)  Hemoglobin: 8.6 g/dL (07-23 @ 09:17)  Hemoglobin: 7.5 g/dL (07-22 @ 07:12)  Hemoglobin: 7.5 g/dL (07-22 @ 00:16)    07-25    130<L>  |  98  |  8   ----------------------------<  155<H>  3.7   |  25  |  0.42<L>    Ca    9.5      25 Jul 2020 07:08  Phos  2.9     07-24    TPro  4.8<L>  /  Alb  1.6<L>  /  TBili  0.6  /  DBili  x   /  AST  76<H>  /  ALT  43  /  AlkPhos  396<H>  07-25    Iron with Total Binding Capacity (07.22.20 @ 19:38)    % Saturation, Iron: 24 %    Iron - Total Binding Capacity.: 159 ug/dL    Iron Total, Serum: 38 ug/dL    Unsaturated Iron Binding Capacity: 121 ug/dL  Ferritin, Serum: 7272: Test Repeated ng/mL (07.22.20 @ 19:35)    Folate, Serum: 3.3 ng/mL (07.22.20 @ 19:35)

## 2020-07-25 NOTE — PROGRESS NOTE ADULT - ATTENDING COMMENTS
I personally saw and examined the patient in detail.  I have spoken to the above provider regarding the assessment and plan of care.  I reviewed the above assessment and plan of care, and agree.  I have made changes in the body of the note where appropriate.
I personally saw and examined the patient in detail.  I have spoken to the above provider regarding the assessment and plan of care.  I reviewed the above assessment and plan of care, and agree.  I have made changes in the body of the note where appropriate.
pt seen and examine today see above plan . 55yo F with PMH of breast CA s/p R mastectomy, radiation, chemo (final treatment May 2019),admitted for dyspnea - evaluate for CHF and   for metastatic disease  but ct angio  no lung mets  and no PE , no chf this time      mri of liver  new mets  - will need  biopsy in  am      npo mid night   hold on Lovenox . mild transaminitis - possible liver mets mri abd/pelvis. high lactate sec to cancer no sign / symptom of infection. anemia of chr dis sec to cancer - stable  fu up hb / hct .
pt seen and examine today see above plan . 55yo F with PMH of breast CA s/p R mastectomy, radiation, chemo (final treatment May 2019),admitted for dyspnea - evaluate for CHF and   for metastatic disease  but ct angio  no lung mets  and no PE . mild transaminitis - possible liver mets mri abd/pelvis. high lactate sec to cancer no sign / symptom of infection.
pt seen and examine today see above plan . 53yo F with PMH of breast CA s/p R mastectomy, radiation, chemo (final treatment May 2019),admitted for dyspnea - evaluate for CHF   echo wnl ruled out  chf    as per cardio   and   for metastatic disease  but ct angio  no lung mets  and no PE but      mri of liver  new mets  - ir need  biopsy today      npo     hold on Lovenox      . mild transaminitis - possible liver mets mri abd/pelvis. high lactate sec to cancer no sign / symptom of infection. anemia of chr dis sec to cancer - stable  fu up hb / hct    , fobt neg . hypercalcemia sec to cancer iv fluid started , calcitonin , fu ca , phos , albumin in am .

## 2020-07-25 NOTE — PROGRESS NOTE ADULT - ASSESSMENT
[ASSESSMENT and  PLAN]  55 yo [place] SAJI Luna with PMH of triple negative breast cancer, dx Spring 2019 followed at [facility]  s/p neoadjuvant chemotx with taxol x2 then AC x4 but after no response underwent right MRM and ALND with 1/21 LN involved; had adjuvant XRT.   Thereafter on observation  In March 2020 noted chest wall abn, s/p bx with scar tissue    Admitted with tachycardia and dyspnea, found to have elevated LFTs  and underwent MRI which showed innumerable liver lesions and bone lesion at right 10th rib along with hypercalcemia (likely malignancy induced) - treated with caclitonin and Zometa with appropriate drop in calcium level    Underwent Liver biopsy on 7/24/20 for diagnostic purposes      RECOMMENDATIONS    - patient to follow up as outpatient with oncology (either at [facility] or in our office) to review results of biopsy  - would also need re-staging imaging with PET/CT as outpatient  - would add folic acid daily (folate level 3.3)  - continue zofran prn for nausea  - clinically stable for discharge home; case discussed with Dr. Fiona Servin (hospitalist)

## 2020-07-27 LAB
CULTURE RESULTS: SIGNIFICANT CHANGE UP
CULTURE RESULTS: SIGNIFICANT CHANGE UP
SPECIMEN SOURCE: SIGNIFICANT CHANGE UP
SPECIMEN SOURCE: SIGNIFICANT CHANGE UP
SURGICAL PATHOLOGY STUDY: SIGNIFICANT CHANGE UP

## 2020-07-30 PROBLEM — I10 ESSENTIAL (PRIMARY) HYPERTENSION: Chronic | Status: ACTIVE | Noted: 2020-07-22

## 2020-07-30 PROBLEM — C50.919 MALIGNANT NEOPLASM OF UNSPECIFIED SITE OF UNSPECIFIED FEMALE BREAST: Chronic | Status: ACTIVE | Noted: 2020-07-22

## 2020-07-30 PROBLEM — I10 ESSENTIAL (PRIMARY) HYPERTENSION: Chronic | Status: ACTIVE | Noted: 2020-07-21

## 2020-08-04 PROBLEM — Z00.00 ENCOUNTER FOR PREVENTIVE HEALTH EXAMINATION: Status: ACTIVE | Noted: 2020-08-04

## 2020-08-04 LAB — PTH RELATED PROT SERPL-MCNC: 6.3 PMOL/L — HIGH

## 2020-08-06 ENCOUNTER — APPOINTMENT (OUTPATIENT)
Dept: CARDIOLOGY | Facility: CLINIC | Age: 55
End: 2020-08-06

## 2020-08-18 ENCOUNTER — APPOINTMENT (OUTPATIENT)
Dept: HEMATOLOGY ONCOLOGY | Facility: CLINIC | Age: 55
End: 2020-08-18

## 2023-06-16 NOTE — ED ADULT NURSE NOTE - NURSING NEURO ORIENTATION
HYSTEROSCOPY D AND C, IUD REMOVAL WITH GRASPER, IUD INSERTION PARAGARD
oriented to person, place and time
